# Patient Record
Sex: FEMALE | Race: WHITE | Employment: FULL TIME | URBAN - METROPOLITAN AREA
[De-identification: names, ages, dates, MRNs, and addresses within clinical notes are randomized per-mention and may not be internally consistent; named-entity substitution may affect disease eponyms.]

---

## 2017-11-16 PROBLEM — R00.2 PALPITATIONS: Status: ACTIVE | Noted: 2017-11-16

## 2017-11-16 PROBLEM — R07.89 OTHER CHEST PAIN: Chronic | Status: ACTIVE | Noted: 2017-11-16

## 2017-12-26 ENCOUNTER — HOSPITAL ENCOUNTER (OUTPATIENT)
Dept: SURGERY | Age: 57
Discharge: HOME | End: 2017-12-26
Attending: ORTHOPAEDIC SURGERY
Payer: COMMERCIAL

## 2017-12-26 ENCOUNTER — HOSPITAL ENCOUNTER (OUTPATIENT)
Dept: PHYSICAL THERAPY | Age: 57
Discharge: HOME | End: 2017-12-26
Attending: ORTHOPAEDIC SURGERY
Payer: COMMERCIAL

## 2017-12-26 VITALS
OXYGEN SATURATION: 100 % | HEIGHT: 66 IN | HEART RATE: 51 BPM | DIASTOLIC BLOOD PRESSURE: 93 MMHG | SYSTOLIC BLOOD PRESSURE: 144 MMHG | WEIGHT: 235.5 LBS | TEMPERATURE: 95.1 F | BODY MASS INDEX: 37.85 KG/M2

## 2017-12-26 LAB
ANION GAP SERPL CALC-SCNC: 8 MMOL/L (ref 7–16)
BACTERIA SPEC CULT: NORMAL
BUN SERPL-MCNC: 20 MG/DL (ref 6–23)
CALCIUM SERPL-MCNC: 9.3 MG/DL (ref 8.3–10.4)
CHLORIDE SERPL-SCNC: 104 MMOL/L (ref 98–107)
CO2 SERPL-SCNC: 30 MMOL/L (ref 21–32)
CREAT SERPL-MCNC: 0.88 MG/DL (ref 0.6–1)
ERYTHROCYTE [DISTWIDTH] IN BLOOD BY AUTOMATED COUNT: 13.6 % (ref 11.9–14.6)
GLUCOSE SERPL-MCNC: 120 MG/DL (ref 65–100)
HCT VFR BLD AUTO: 41.3 % (ref 35.8–46.3)
HGB BLD-MCNC: 13.5 G/DL (ref 11.7–15.4)
MCH RBC QN AUTO: 29.2 PG (ref 26.1–32.9)
MCHC RBC AUTO-ENTMCNC: 32.7 G/DL (ref 31.4–35)
MCV RBC AUTO: 89.4 FL (ref 79.6–97.8)
PLATELET # BLD AUTO: 256 K/UL (ref 150–450)
PMV BLD AUTO: 11 FL (ref 10.8–14.1)
POTASSIUM SERPL-SCNC: 3.3 MMOL/L (ref 3.5–5.1)
RBC # BLD AUTO: 4.62 M/UL (ref 4.05–5.25)
SERVICE CMNT-IMP: NORMAL
SODIUM SERPL-SCNC: 142 MMOL/L (ref 136–145)
WBC # BLD AUTO: 6.4 K/UL (ref 4.3–11.1)

## 2017-12-26 PROCEDURE — 85027 COMPLETE CBC AUTOMATED: CPT | Performed by: ANESTHESIOLOGY

## 2017-12-26 PROCEDURE — 80048 BASIC METABOLIC PNL TOTAL CA: CPT | Performed by: ANESTHESIOLOGY

## 2017-12-26 PROCEDURE — 97161 PT EVAL LOW COMPLEX 20 MIN: CPT

## 2017-12-26 PROCEDURE — 77030027138 HC INCENT SPIROMETER -A

## 2017-12-26 PROCEDURE — 87641 MR-STAPH DNA AMP PROBE: CPT | Performed by: ORTHOPAEDIC SURGERY

## 2017-12-26 PROCEDURE — 36415 COLL VENOUS BLD VENIPUNCTURE: CPT | Performed by: ANESTHESIOLOGY

## 2017-12-26 RX ORDER — RANITIDINE 150 MG/1
150 TABLET, FILM COATED ORAL
COMMUNITY

## 2017-12-26 RX ORDER — MELATONIN
2000 DAILY
COMMUNITY

## 2017-12-26 RX ORDER — DM/PE/ACETAMINOPHEN/CHLORPHENR 10-5-325-2
2000 TABLET, SEQUENTIAL ORAL 2 TIMES DAILY
COMMUNITY
End: 2020-02-27 | Stop reason: CLARIF

## 2017-12-26 RX ORDER — DIAPER,BRIEF,INFANT-TODD,DISP
1 EACH MISCELLANEOUS DAILY
COMMUNITY

## 2017-12-26 RX ORDER — LANOLIN ALCOHOL/MO/W.PET/CERES
1000 CREAM (GRAM) TOPICAL DAILY
COMMUNITY

## 2017-12-26 RX ORDER — LEVOTHYROXINE SODIUM 150 UG/1
150 TABLET ORAL
COMMUNITY

## 2017-12-26 RX ORDER — DICLOFENAC SODIUM 75 MG/1
75 TABLET, DELAYED RELEASE ORAL 2 TIMES DAILY
COMMUNITY
End: 2018-01-13

## 2017-12-26 RX ORDER — ASCORBIC ACID 500 MG
1000 TABLET ORAL DAILY
COMMUNITY

## 2017-12-26 RX ORDER — ASPIRIN 81 MG/1
81 TABLET ORAL
COMMUNITY
End: 2018-01-13

## 2017-12-26 NOTE — PERIOP NOTES
[address] December 26, 2017       RE: Sidney Myrick      To Whom It May Concern,    This is to certify that Sidney Myrick was at [facility] for a pre-op assessment/appointment for surgery. Please feel free to contact my office if you have any questions or concerns. Thank you for your assistance in this matter.       Sincerely,  Amish Sharma RN    [phone]

## 2017-12-26 NOTE — PROGRESS NOTES
12/26/17 1200   Oxygen Therapy   O2 Sat (%) 96 %   Pulse via Oximetry 51 beats per minute   O2 Device Room air   Pre-Treatment   Breath Sounds Bilateral Clear   Pre FEV1 (liters) 2.6 liters   % Predicted 92   Incentive Spirometry Treatment   Actual Volume (ml) 2000 ml     Initial respiratory Assessment completed with pt. Pt was interviewed and evaluated in [facility] prior to surgery. Patient ID:  Dariela Pelaez  [id number]  60 y.o.  1960  Surgeon: Ej Schmidt  Date of Surgery: 1/12/2018  Procedure: Total Left Knee Arthroplasty  Primary Care Physician: Talia Oakley -[phone]  Specialists:                                  Pt instructed in the use of Incentive Spirometry. Pt instructed to bring Incentive Spirometer back on date of surgery & to start using Is upon return to pt room.     Pt taught proper cough technique      History of smoking:  FORMER SMOKER -CURRENTLY VAPES FOR PAST 3 YEARS   Quit date:    Secondhand smoke:NONE      Past procedures with Oxygen desaturation:NONE    Past Medical History:   Diagnosis Date    Arthritis     CAD (coronary artery disease)     one stent    Former cigarette smoker     GERD (gastroesophageal reflux disease)     controlled with med     Hypertension     on med for control     Morbid obesity ([address]     BMI=38.0    Nicotine vapor product user     Other chest pain 11/16/2017    Palpitations 11/16/2017    Psychiatric disorder     On cymbalta for depression     PUD (peptic ulcer disease)     hx; no current problems    Sleep apnea     recently diagnosed; instructed to bring c-pap dos if received by then    Thyroid disease     hypo                                                                                                                                                       Respiratory history:                                   DENIES SOB                                  Respiratory meds:                                         FAMILY PRESENT: SPOUSE,                                             PAST SLEEP STUDY:        YES      - LAST Friday NIGHT   HX OF CHIN:                      YES                                   CHIN assessment:                                               SLEEPS ON SIDE       PHYSICAL EXAM   Body mass index is 38.01 kg/(m^2).    Visit Vitals    BP (!) 144/93 (BP 1 Location: Right arm, BP Patient Position: At rest;Sitting)    Pulse (!) 51    Temp 95.1 °F (35.1 °C)    Ht 5' 6\" (1.676 m)    Wt 106.8 kg (235 lb 8 oz)    SpO2 100%    BMI 38.01 kg/m2     Neck circumference:    42cm    Loud snoring:YES       Witnessed apnea or wakening gasping or choking: , APNEA    Awakens with headaches:DENIES    Morning or daytime tiredness/ sleepiness:   TIRED     Dry mouth or sore throat in morning:YES     Harris stage:4    SACS score:30    STOP/BAN                              CPAP:SUPPOSED TO GET CPAP BEFORE SURGERY                CONT SAT HS& O2 AT 3 LPM HS   IF DOES NOT HAVE HER CPAP YET     Referrals:    Pt. Phone Number:

## 2017-12-26 NOTE — PERIOP NOTES
Patient verified name, , and surgery as listed in [facility]. Type 3 surgery, walk in assessment complete. Labs per surgeon: MRSA nasal swab ; results pending. Labs per anesthesia protocol: CBC, BMP; results within [facility] protocols. T&S ordered for dos. EKG: last done 17; within [facility] protocols. Record on chart. Stress test dated 17 printed and on chart for reference if needed. Patient has [facility] cardiology appointment on 18. Will need to obtain note. Hibiclens and instructions to return bottle on DOS given per hospital policy. Nasal Swab collected per MD order and instructions for Mupirocin nasal ointment if required. Patient provided with handouts including Guide to Surgery, Pain Management, Hand Hygiene, Blood Transfusion Education, and [facility] Brochure. Patient answered medical/surgical history questions at their best of ability. All prior to admission medications documented in [facility]. Original medication prescription bottles not visualized during patient appointment. Patient instructed to hold all vitamins 7 days prior to surgery and NSAIDS 5 days prior to surgery. Medications to be held: vitamins, diclofenac. Patient instructed to continue previous medications as prescribed prior to surgery and to take the following medications the day of surgery according to anesthesia guidelines with a small sip of water: synthroid, duloxetine. Patient teach back successful and patient demonstrates knowledge of instruction.

## 2017-12-26 NOTE — PROGRESS NOTES
Candy Reyes  : [birth year](89 y.o.) [facility] [place] [address].  [phone]       Physical Therapy Prehab Plan of Treatment and Evaluation Summary:2017    ICD-10: Treatment Diagnosis:   · Pain in Left Knee (M25.562)  · Stiffness of Left Knee, Not elsewhere classified (M25.662)  · Difficulty in walking, Not elsewhere classified (R26.2)  · Other abnormalities of gait and mobility (R26.89)  Precautions/Allergies:   Codeine; Macrobid [nitrofurantoin monohyd/m-cryst]; and Tramadol  MEDICAL/REFERRING DIAGNOSIS:  Pain in left knee [M25.562]  Unilateral primary osteoarthritis, left knee [M17.12]  REFERRING PHYSICIAN: Thao Bradley MD  DATE OF SURGERY: 18   Assessment:   Comments:  Pain in left knee joint with decreased independence with functional mobility. Pt plans to return home following hospital stay. Pt's  present and supportive. PROBLEM LIST (Impacting functional limitations):  Ms. Christal Ahumada presents with the following left lower extremity(s) problems:  1. Transfers  2. Gait  3. Strength  4. Range of Motion  5. Pain   INTERVENTIONS PLANNED:  1. Home Exercise Program  2. Educational Discussion     TREATMENT PLAN: Effective Dates: 2017 TO 2017. Frequency/Duration: Patient to continue to perform home exercise program at least twice per day up until her surgery. GOALS: (Goals have been discussed and agreed upon with patient.)  Discharge Goals: Time Frame: 1 Day  1. Patient will demonstrate independence with a home exercise program designed to increase strength, range of motion and pain control to minimize functional deficits and optimize patient for total joint replacement. Rehabilitation Potential For Stated Goals: Good  Regarding [address] [place] [address] [address] therapy, I certify that the treatment plan above will be carried out by a therapist or under their direction.   Thank you for this referral,  Diego Gardner, PT HISTORY:   Present Symptoms:  Pain Intensity 1: 2  Pain Location 1: Knee  Pain Orientation 1: Left   History of Present Injury/Illness (Reason for Referral):  Medical/Referring Diagnosis: Pain in left knee [M25.562]  Unilateral primary osteoarthritis, left knee [M17.12]   Past Medical History/Comorbidities:   Ms. Jayro Bennett  has a past medical history of Arthritis; CAD (coronary artery disease); Former cigarette smoker; GERD (gastroesophageal reflux disease); Hypertension; Morbid obesity ([address] Nicotine vapor product user; Other chest pain (2017); Palpitations (2017); Psychiatric disorder; PUD (peptic ulcer disease); Sleep apnea; and Thyroid disease. She also has no past medical history of Adverse effect of anesthesia; Aneurysm ([address] Arrhythmia; Asthma; Autoimmune disease ([address] Cancer ([address] Chronic kidney disease; Chronic obstructive pulmonary disease ([address] Chronic pain; Coagulation defects; Coagulation disorder ([address] COPD; Diabetes ([address] Difficult intubation; Endocarditis; Heart failure ([address] Ill-defined condition; Liver disease; Malignant hyperthermia due to anesthesia; Nausea & vomiting; Non-nicotine vapor product user; Other ill-defined conditions(799.89); Pseudocholinesterase deficiency; Rheumatic fever; Seizures ([address] Stroke [facility]); or Thromboembolus ([address] Ms. Jayro Bennett  has a past surgical history that includes hx hysterectomy; hx orthopaedic; hx orthopaedic; hx orthopaedic; hx heent; hx cataract removal; hx coronary stent placement (2012); hx heart catheterization (2013); and hx  section (, ).   Social History/Living Environment:   Home Environment: Private residence  # Steps to Enter: 2  One/Two Story Residence: Two story, live on 1st floor  # of Interior Steps: 13  Living Alone: No  Support Systems: Spouse/Significant Other/Partner  Patient Expects to be Discharged to[de-identified] Private residence  Current DME Used/Available at Home: Cane, straight  Tub or Shower Type: Shower  Work/Activity:  Employment requires heavy activity, consisting a lot of getting in and out of a vehicle. Dominant Side:  LEFT  Current Medications:  See Pre-assessment nursing note   Number of Personal Factors/Comorbidities that affect the Plan of Care: 0: LOW COMPLEXITY   EXAMINATION:   ADLs (Current Functional Status):   Ambulation:  [x] Independent  [] Walk Indoors Only  [] Walk Outdoors  [] Use Assistive Device  [] Use Wheelchair Only Dressing:  [x] Independent  Requires Assistance from Someone for:  [] Sock/Shoes  [] Pants  [] Everything   Bathing/Showering:   [x] Independent  [] Requires Assistance from Someone  [] Sponge Bath Only Household Activities:  [x] Routine house and yard work  [] Light Housework Only  [] None   Observation/Orthostatic Postural Assessment:   Within defined limits   ROM/Flexibility:   Gross Assessment: Yes  AROM: Generally decreased, functional                LLE AROM  L Knee Flexion: 95  L Knee Extension: 35      RLE Assessment  RLE Assessment (WDL): Within defined limits   Strength:   Gross Assessment: Yes  Strength: Generally decreased, functional                  Functional Mobility:    Gross Assessment: Yes  Coordination: Generally decreased, functional    Gait Description (WDL): Within defined limits  Stand to Sit: Independent  Sit to Stand: Independent  Distance (ft): 1000 Feet (ft)  Ambulation - Level of Assistance: Independent  Gait Abnormalities: Antalgic          Balance:    Sitting: Intact  Standing: Intact   Body Structures Involved:  1. Bones  2. Joints  3. Muscles Body Functions Affected:  1. Neuromusculoskeletal  2. Movement Related Activities and Participation Affected:  1. Mobility   Number of elements that affect the Plan of Care: 4+: HIGH COMPLEXITY   CLINICAL PRESENTATION:   Presentation: Stable and uncomplicated: LOW COMPLEXITY   CLINICAL DECISION MAKING:   Outcome Measure:    Tool Used: Lower Extremity Functional Scale (LEFS)  Score:  Initial: 43/80 Most Recent: X/80 (Date: -- )   Interpretation of Score: 20 questions each scored on a 5 point scale with 0 representing \"extreme difficulty or unable to perform\" and 4 representing \"no difficulty\". The lower the score, the greater the functional disability. 80/80 represents no disability. Minimal detectable change is 9 points. Score 80 79-65 64-49 48-33 32-17 16-1 0   Modifier CH CI CJ CK CL CM CN     ? Mobility - Walking and Moving Around:     - CURRENT STATUS: CK - 40%-59% impaired, limited or restricted    - GOAL STATUS: CK - 40%-59% impaired, limited or restricted    - D/C STATUS:  CK - 40%-59% impaired, limited or restricted  Medical Necessity:   · Ms. Coby Hsieh is expected to optimize her lower extremity strength and ROM in preparation for joint replacement surgery. Reason for Services/Other Comments:  · Achieve baseline assesment of musculoskeletal system, functional mobility and home environment. , educate in PT HEP in preparation for surgery, educate in hospital plan of care. Use of outcome tool(s) and clinical judgement create a POC that gives a: Clear prediction of patient's progress: LOW COMPLEXITY   TREATMENT:   Treatment/Session Assessment:  Patient was instructed in PT- HEP to increase strength and ROM in LEs. Answered all questions. · Post session pain:  2  · Compliance with Program/Exercises: compliant most of the time.   Total Treatment Duration:  PT Patient Time In/Time Out  Time In: 1300  Time Out: [address], PT

## 2018-01-04 PROBLEM — Z01.810 PREOP CARDIOVASCULAR EXAM: Status: ACTIVE | Noted: 2018-01-04

## 2018-01-05 NOTE — PERIOP NOTES
Pt evaluated by Cardiology 1/4/18. Per MD note, pt low CV risk, normal myocardial perfusion scan. Complete office note in EMR and copy on hard chart for reference.

## 2018-01-05 NOTE — ADVANCED PRACTICE NURSE
Total Joint Surgery Preoperative Chart Review      Patient ID:  Carol Ann Tineo  [id number]  46 y.o.  1960  Surgeon: Dr Nusrat Hammer  Date of Surgery: 2018  Procedure: Total Left Knee Arthroplasty  Primary Care Physician: Jeremie Stevens -[phone]  Specialty Physician(s):      Subjective:   Carol Ann Tineo is a 59 y.o. WHITE OR  female who presents for preoperative evaluation for Total Left Knee arthroplasty. This is a preoperative chart review note based on data collected by the nurse at the surgical Pre-Assessment visit. Past Medical History:   Diagnosis Date    Arthritis     CAD (coronary artery disease)     one stent    Former cigarette smoker     GERD (gastroesophageal reflux disease)     controlled with med     Hypertension     on med for control     Morbid obesity ([address]     BMI=38.0    Nicotine vapor product user     Other chest pain 2017    Palpitations 2017    Psychiatric disorder     On cymbalta for depression     PUD (peptic ulcer disease)     hx; no current problems    Sleep apnea     recently diagnosed; instructed to bring c-pap dos if received by then    Thyroid disease     hypo      Past Surgical History:   Procedure Laterality Date    HX CATARACT REMOVAL      bilateral    HX  SECTION  ,     HX CORONARY STENT PLACEMENT  2012     3.5 x 32 Ion ALEE to proximal RCA by Dr Lashawn Cunningham  2013    LM:nl,pLAD:30% stenosis,LCX:mild irregularities. .RCA:dominant vessel with patent long stented segment. 40-50% ostial stenosis with FFR=0.89. LV:nl EF.     HX HEENT      Thyroidectomy    HX HYSTERECTOMY      with bladder tac    HX ORTHOPAEDIC      Right shoulder surgery    HX ORTHOPAEDIC      bilateral carpal tunnel    HX ORTHOPAEDIC      foot surgery     Family History   Problem Relation Age of Onset    Heart Disease Father     Cancer Sister     No Known Problems Mother     Thyroid Disease Brother  Thyroid Disease Sister     Diabetes Neg Hx     Lung Disease Neg Hx     Stroke Neg Hx       Social History   Substance Use Topics    Smoking status: Former Smoker     Packs/day: 1.00     Years: 3.00     Quit date: 12/26/2012    Smokeless tobacco: Never Used    Alcohol use No       Prior to Admission medications    Medication Sig Start Date End Date Taking? Authorizing Provider   aspirin delayed-release 81 mg tablet Take 81 mg by mouth nightly. Yes Historical Provider   levothyroxine (SYNTHROID) 150 mcg tablet Take 150 mcg by mouth Daily (before breakfast). Take / use AM day of surgery  per anesthesia protocols. Indications: hypothyroidism   Yes Historical Provider   diclofenac EC (VOLTAREN) 75 mg EC tablet Take 75 mg by mouth two (2) times a day. Indications: OSTEOARTHRITIS   Yes Historical Provider   raNITIdine (ZANTAC) 150 mg tablet Take 150 mg by mouth nightly. Indications: gastroesophageal reflux disease   Yes Historical Provider   TURMERIC ROOT EXTRACT PO Take 400 mg by mouth daily. Yes Historical Provider   cyanocobalamin 1,000 mcg tablet Take 1,000 mcg by mouth daily. Yes Historical Provider   ascorbic acid, vitamin C, (VITAMIN C) 500 mg tablet Take 1,000 mg by mouth daily. Yes Historical Provider   A-CYSTEINE/ARG ZINC/GLUT/MV-MN (L GLUTAMINE-N ACET-ARG-VIT-MIN PO) Take 1,000 mg by mouth daily. Yes Historical Provider   biotin 10,000 mcg cap Take 1 Cap by mouth daily. Yes Historical Provider   cholecalciferol (VITAMIN D3) 1,000 unit tablet Take 2,000 Units by mouth daily. Yes Historical Provider   glucosamine 1,000 mg tab Take 2,000 mg by mouth two (2) times a day. Yes Historical Provider   omega 3-dha-epa-fish oil (FISH OIL) 100-160-1,000 mg cap Take 1 Cap by mouth daily. Yes Historical Provider   COCONUT OIL PO Take 1,000 mg by mouth daily. Yes Historical Provider   B.infantis-B.ani-B.long-B.bifi (PROBIOTIC 4X) 10-15 mg TbEC Take 1 Tab by mouth two (2) times a day.    Yes Historical Provider   nebivolol (BYSTOLIC) 10 mg tablet Take 1 Tab by mouth daily. Indications: hypertension  Patient taking differently: Take 10 mg by mouth nightly. Indications: hypertension 12/22/17  Yes Inocencia Eckert MD   ezetimibe (ZETIA) 10 mg tablet Take 10 mg by mouth nightly. Indications: hypercholesterolemia   Yes Historical Provider   losartan (COZAAR) 100 mg tablet TAKE 1 TABLET BY MOUTH EVERY DAY 10/16/17  Yes Historical Provider   nitroglycerin (NITROSTAT) 0.4 mg SL tablet 1 Tab by SubLINGual route every five (5) minutes as needed for Chest Pain. 11/16/17  Yes Inocencia Eckert MD   hydrochlorothiazide (HYDRODIURIL) 25 mg tablet Take 25 mg by mouth daily. Indications: hypertension   Yes Historical Provider   DULoxetine (CYMBALTA) 60 mg capsule Take 60 mg by mouth daily. Take / use AM day of surgery  per anesthesia protocols. Indications: ANXIETY WITH DEPRESSION   Yes Historical Provider   methylPREDNISolone (MEDROL DOSEPACK) 4 mg tablet Take  by mouth. Historical Provider     Allergies   Allergen Reactions    Codeine Itching    Macrobid [Nitrofurantoin Monohyd/M-Cryst] Itching    Tramadol Itching          Objective:     Physical Exam:   Visit Vitals    BP (!) 144/93 (BP 1 Location: Right arm, BP Patient Position: At rest;Sitting)    Pulse (!) 51    Temp 95.1 °F (35.1 °C)    Ht 5' 6\" (1.676 m)    Wt 106.8 kg (235 lb 8 oz)    SpO2 100%    BMI 38.01 kg/m2         ECG:    EKG Results     None          Data Review:   Labs:   reviewed      Problem List:  )  Patient Active Problem List   Diagnosis Code    Unstable angina (HCC) I20.0    Dyslipidemia E78.5    Obesity E66.9    Hypertension I10    Tobacco abuse Z72.0    Coronary atherosclerosis of native coronary artery I25.10    Accelerated hypertension I10    Other chest pain R07.89    Palpitations R00.2    Preop cardiovascular exam Z01.810       Total Joint Surgery Pre-Assessment Recommendations:     New diagnosis of CHIN.  Patient instructed to bring CPAP if not will have autoset on standby for use during  Hospitalization. Recommend continuous saturation monitoring hours of sleep, during hospitalization.       Signed By: Huy Hobson NP-C    January 5, 2018

## 2018-01-11 ENCOUNTER — ANESTHESIA EVENT (OUTPATIENT)
Dept: SURGERY | Age: 58
End: 2018-01-11
Payer: COMMERCIAL

## 2018-01-12 ENCOUNTER — HOSPITAL ENCOUNTER (OUTPATIENT)
Age: 58
Setting detail: OBSERVATION
Discharge: HOME HEALTH | End: 2018-01-13
Attending: ORTHOPAEDIC SURGERY | Admitting: ORTHOPAEDIC SURGERY
Payer: COMMERCIAL

## 2018-01-12 ENCOUNTER — ANESTHESIA (OUTPATIENT)
Dept: SURGERY | Age: 58
End: 2018-01-12
Payer: COMMERCIAL

## 2018-01-12 DIAGNOSIS — M17.10 KNEE ARTHROPATHY: Primary | ICD-10-CM

## 2018-01-12 LAB
ABO + RH BLD: NORMAL
APTT PPP: 25.2 SEC (ref 23.2–35.3)
BLOOD GROUP ANTIBODIES SERPL: NORMAL
GLUCOSE BLD STRIP.AUTO-MCNC: 100 MG/DL (ref 65–100)
HGB BLD-MCNC: 10.9 G/DL (ref 11.7–15.4)
INR PPP: 0.9
PROTHROMBIN TIME: 12 SEC (ref 11.5–14.5)
SPECIMEN EXP DATE BLD: NORMAL

## 2018-01-12 PROCEDURE — 86901 BLOOD TYPING SEROLOGIC RH(D): CPT | Performed by: ORTHOPAEDIC SURGERY

## 2018-01-12 PROCEDURE — 99218 HC RM OBSERVATION: CPT

## 2018-01-12 PROCEDURE — 85018 HEMOGLOBIN: CPT | Performed by: ORTHOPAEDIC SURGERY

## 2018-01-12 PROCEDURE — 77030020782 HC GWN BAIR PAWS FLX 3M -B: Performed by: ANESTHESIOLOGY

## 2018-01-12 PROCEDURE — G0378 HOSPITAL OBSERVATION PER HR: HCPCS

## 2018-01-12 PROCEDURE — C1713 ANCHOR/SCREW BN/BN,TIS/BN: HCPCS | Performed by: ORTHOPAEDIC SURGERY

## 2018-01-12 PROCEDURE — 86580 TB INTRADERMAL TEST: CPT | Performed by: ORTHOPAEDIC SURGERY

## 2018-01-12 PROCEDURE — 77030002966 HC SUT PDS J&J -A: Performed by: ORTHOPAEDIC SURGERY

## 2018-01-12 PROCEDURE — 77030018846 HC SOL IRR STRL H20 ICUM -A: Performed by: ORTHOPAEDIC SURGERY

## 2018-01-12 PROCEDURE — 97161 PT EVAL LOW COMPLEX 20 MIN: CPT

## 2018-01-12 PROCEDURE — 76060000035 HC ANESTHESIA 2 TO 2.5 HR: Performed by: ORTHOPAEDIC SURGERY

## 2018-01-12 PROCEDURE — 77030019557 HC ELECTRD VES SEAL MEDT -F: Performed by: ORTHOPAEDIC SURGERY

## 2018-01-12 PROCEDURE — 74011250636 HC RX REV CODE- 250/636

## 2018-01-12 PROCEDURE — 77030002933 HC SUT MCRYL J&J -A: Performed by: ORTHOPAEDIC SURGERY

## 2018-01-12 PROCEDURE — 94760 N-INVAS EAR/PLS OXIMETRY 1: CPT

## 2018-01-12 PROCEDURE — 77030003665 HC NDL SPN BBMI -A: Performed by: ANESTHESIOLOGY

## 2018-01-12 PROCEDURE — 74011000250 HC RX REV CODE- 250

## 2018-01-12 PROCEDURE — 77030011208: Performed by: ORTHOPAEDIC SURGERY

## 2018-01-12 PROCEDURE — 82962 GLUCOSE BLOOD TEST: CPT

## 2018-01-12 PROCEDURE — 76210000016 HC OR PH I REC 1 TO 1.5 HR: Performed by: ORTHOPAEDIC SURGERY

## 2018-01-12 PROCEDURE — C1776 JOINT DEVICE (IMPLANTABLE): HCPCS | Performed by: ORTHOPAEDIC SURGERY

## 2018-01-12 PROCEDURE — 77030013727 HC IRR FAN PULSVC ZIMM -B: Performed by: ORTHOPAEDIC SURGERY

## 2018-01-12 PROCEDURE — 74011250636 HC RX REV CODE- 250/636: Performed by: ORTHOPAEDIC SURGERY

## 2018-01-12 PROCEDURE — 77030000032 HC CUF TRNQT ZIMM -B: Performed by: ORTHOPAEDIC SURGERY

## 2018-01-12 PROCEDURE — 74011000302 HC RX REV CODE- 302: Performed by: ORTHOPAEDIC SURGERY

## 2018-01-12 PROCEDURE — 74011250637 HC RX REV CODE- 250/637: Performed by: ORTHOPAEDIC SURGERY

## 2018-01-12 PROCEDURE — 77030006790 HC BLD SAW OSC ZIMM -B: Performed by: ORTHOPAEDIC SURGERY

## 2018-01-12 PROCEDURE — 74011000250 HC RX REV CODE- 250: Performed by: ANESTHESIOLOGY

## 2018-01-12 PROCEDURE — 77030018547 HC SUT ETHBND1 J&J -B: Performed by: ORTHOPAEDIC SURGERY

## 2018-01-12 PROCEDURE — 77030018836 HC SOL IRR NACL ICUM -A: Performed by: ORTHOPAEDIC SURGERY

## 2018-01-12 PROCEDURE — 85610 PROTHROMBIN TIME: CPT | Performed by: ORTHOPAEDIC SURGERY

## 2018-01-12 PROCEDURE — 77030011640 HC PAD GRND REM COVD -A: Performed by: ORTHOPAEDIC SURGERY

## 2018-01-12 PROCEDURE — 76942 ECHO GUIDE FOR BIOPSY: CPT | Performed by: ORTHOPAEDIC SURGERY

## 2018-01-12 PROCEDURE — 77030012935 HC DRSG AQUACEL BMS -B: Performed by: ORTHOPAEDIC SURGERY

## 2018-01-12 PROCEDURE — 74011250636 HC RX REV CODE- 250/636: Performed by: ANESTHESIOLOGY

## 2018-01-12 PROCEDURE — 77030013819 HC MX SYS CEM ZIMM -B: Performed by: ORTHOPAEDIC SURGERY

## 2018-01-12 PROCEDURE — 97165 OT EVAL LOW COMPLEX 30 MIN: CPT

## 2018-01-12 PROCEDURE — 77030007880 HC KT SPN EPDRL BBMI -B: Performed by: ANESTHESIOLOGY

## 2018-01-12 PROCEDURE — 85730 THROMBOPLASTIN TIME PARTIAL: CPT | Performed by: ORTHOPAEDIC SURGERY

## 2018-01-12 PROCEDURE — 77030008467 HC STPLR SKN COVD -B: Performed by: ORTHOPAEDIC SURGERY

## 2018-01-12 PROCEDURE — 76010010054 HC POST OP PAIN BLOCK: Performed by: ORTHOPAEDIC SURGERY

## 2018-01-12 PROCEDURE — 76010000162 HC OR TIME 1.5 TO 2 HR INTENSV-TIER 1: Performed by: ORTHOPAEDIC SURGERY

## 2018-01-12 PROCEDURE — 77030034849: Performed by: ORTHOPAEDIC SURGERY

## 2018-01-12 PROCEDURE — 77030003602 HC NDL NRV BLK BBMI -B: Performed by: ANESTHESIOLOGY

## 2018-01-12 PROCEDURE — 74011000250 HC RX REV CODE- 250: Performed by: ORTHOPAEDIC SURGERY

## 2018-01-12 DEVICE — CEMENT BNE GENTAMC HV R+G 40GM -- PALACOS R+G 5036964: Type: IMPLANTABLE DEVICE | Site: KNEE | Status: FUNCTIONAL

## 2018-01-12 DEVICE — GENESIS II NON-POROUS TIBIAL                                    BASEPLATE SIZE 4 LEFT
Type: IMPLANTABLE DEVICE | Site: KNEE | Status: FUNCTIONAL
Brand: GENESIS II

## 2018-01-12 DEVICE — LEGION POSTERIOR STABILIZED HIGH                                    FLEX HIGHLY CROSS LINKED                                    POLYETHYLENE SIZE 3-4 11MM
Type: IMPLANTABLE DEVICE | Site: KNEE | Status: FUNCTIONAL
Brand: LEGION

## 2018-01-12 RX ORDER — MORPHINE SULFATE 10 MG/ML
INJECTION, SOLUTION INTRAMUSCULAR; INTRAVENOUS AS NEEDED
Status: DISCONTINUED | OUTPATIENT
Start: 2018-01-12 | End: 2018-01-12 | Stop reason: HOSPADM

## 2018-01-12 RX ORDER — SODIUM CHLORIDE, SODIUM LACTATE, POTASSIUM CHLORIDE, CALCIUM CHLORIDE 600; 310; 30; 20 MG/100ML; MG/100ML; MG/100ML; MG/100ML
1000 INJECTION, SOLUTION INTRAVENOUS CONTINUOUS
Status: DISCONTINUED | OUTPATIENT
Start: 2018-01-12 | End: 2018-01-12 | Stop reason: HOSPADM

## 2018-01-12 RX ORDER — ACETAMINOPHEN 500 MG
500 TABLET ORAL ONCE
Status: DISCONTINUED | OUTPATIENT
Start: 2018-01-12 | End: 2018-01-12 | Stop reason: HOSPADM

## 2018-01-12 RX ORDER — ASPIRIN 325 MG
325 TABLET, DELAYED RELEASE (ENTERIC COATED) ORAL EVERY 12 HOURS
Qty: 60 TAB | Refills: 0 | Status: SHIPPED | OUTPATIENT
Start: 2018-01-12 | End: 2019-08-16

## 2018-01-12 RX ORDER — LIDOCAINE HYDROCHLORIDE 10 MG/ML
0.1 INJECTION INFILTRATION; PERINEURAL AS NEEDED
Status: DISCONTINUED | OUTPATIENT
Start: 2018-01-12 | End: 2018-01-12 | Stop reason: HOSPADM

## 2018-01-12 RX ORDER — FENTANYL CITRATE 50 UG/ML
100 INJECTION, SOLUTION INTRAMUSCULAR; INTRAVENOUS AS NEEDED
Status: DISCONTINUED | OUTPATIENT
Start: 2018-01-12 | End: 2018-01-12 | Stop reason: HOSPADM

## 2018-01-12 RX ORDER — KETOROLAC TROMETHAMINE 30 MG/ML
INJECTION, SOLUTION INTRAMUSCULAR; INTRAVENOUS AS NEEDED
Status: DISCONTINUED | OUTPATIENT
Start: 2018-01-12 | End: 2018-01-12 | Stop reason: HOSPADM

## 2018-01-12 RX ORDER — ROPIVACAINE HYDROCHLORIDE 5 MG/ML
INJECTION, SOLUTION EPIDURAL; INFILTRATION; PERINEURAL AS NEEDED
Status: DISCONTINUED | OUTPATIENT
Start: 2018-01-12 | End: 2018-01-12 | Stop reason: HOSPADM

## 2018-01-12 RX ORDER — OXYCODONE AND ACETAMINOPHEN 7.5; 325 MG/1; MG/1
1 TABLET ORAL
Qty: 80 TAB | Refills: 0 | Status: SHIPPED | OUTPATIENT
Start: 2018-01-12 | End: 2019-08-16

## 2018-01-12 RX ORDER — NALOXONE HYDROCHLORIDE 0.4 MG/ML
.2-.4 INJECTION, SOLUTION INTRAMUSCULAR; INTRAVENOUS; SUBCUTANEOUS
Status: DISCONTINUED | OUTPATIENT
Start: 2018-01-12 | End: 2018-01-13 | Stop reason: HOSPADM

## 2018-01-12 RX ORDER — MORPHINE SULFATE 4 MG/ML
6 INJECTION, SOLUTION INTRAMUSCULAR; INTRAVENOUS
Status: DISCONTINUED | OUTPATIENT
Start: 2018-01-12 | End: 2018-01-13 | Stop reason: HOSPADM

## 2018-01-12 RX ORDER — LOSARTAN POTASSIUM 50 MG/1
100 TABLET ORAL DAILY
Status: DISCONTINUED | OUTPATIENT
Start: 2018-01-13 | End: 2018-01-13 | Stop reason: HOSPADM

## 2018-01-12 RX ORDER — PROPOFOL 10 MG/ML
INJECTION, EMULSION INTRAVENOUS
Status: DISCONTINUED | OUTPATIENT
Start: 2018-01-12 | End: 2018-01-12 | Stop reason: HOSPADM

## 2018-01-12 RX ORDER — DEXAMETHASONE SODIUM PHOSPHATE 100 MG/10ML
10 INJECTION INTRAMUSCULAR; INTRAVENOUS ONCE
Status: DISCONTINUED | OUTPATIENT
Start: 2018-01-13 | End: 2018-01-13 | Stop reason: HOSPADM

## 2018-01-12 RX ORDER — ZOLPIDEM TARTRATE 5 MG/1
5 TABLET ORAL
Status: DISCONTINUED | OUTPATIENT
Start: 2018-01-12 | End: 2018-01-13 | Stop reason: HOSPADM

## 2018-01-12 RX ORDER — PANTOPRAZOLE SODIUM 40 MG/1
40 TABLET, DELAYED RELEASE ORAL
Status: DISCONTINUED | OUTPATIENT
Start: 2018-01-12 | End: 2018-01-13 | Stop reason: HOSPADM

## 2018-01-12 RX ORDER — OXYCODONE HYDROCHLORIDE 5 MG/1
5 TABLET ORAL
Status: DISCONTINUED | OUTPATIENT
Start: 2018-01-12 | End: 2018-01-12 | Stop reason: HOSPADM

## 2018-01-12 RX ORDER — ONDANSETRON 2 MG/ML
4 INJECTION INTRAMUSCULAR; INTRAVENOUS ONCE
Status: DISCONTINUED | OUTPATIENT
Start: 2018-01-12 | End: 2018-01-12 | Stop reason: HOSPADM

## 2018-01-12 RX ORDER — CEFAZOLIN SODIUM/WATER 2 G/20 ML
2 SYRINGE (ML) INTRAVENOUS ONCE
Status: COMPLETED | OUTPATIENT
Start: 2018-01-12 | End: 2018-01-12

## 2018-01-12 RX ORDER — MIDAZOLAM HYDROCHLORIDE 1 MG/ML
2 INJECTION, SOLUTION INTRAMUSCULAR; INTRAVENOUS
Status: DISCONTINUED | OUTPATIENT
Start: 2018-01-12 | End: 2018-01-12 | Stop reason: HOSPADM

## 2018-01-12 RX ORDER — NEOMYCIN AND POLYMYXIN B SULFATES 40; 200000 MG/ML; [USP'U]/ML
SOLUTION IRRIGATION AS NEEDED
Status: DISCONTINUED | OUTPATIENT
Start: 2018-01-12 | End: 2018-01-12 | Stop reason: HOSPADM

## 2018-01-12 RX ORDER — NALOXONE HYDROCHLORIDE 0.4 MG/ML
0.1 INJECTION, SOLUTION INTRAMUSCULAR; INTRAVENOUS; SUBCUTANEOUS AS NEEDED
Status: DISCONTINUED | OUTPATIENT
Start: 2018-01-12 | End: 2018-01-12 | Stop reason: HOSPADM

## 2018-01-12 RX ORDER — AMOXICILLIN 250 MG
2 CAPSULE ORAL DAILY
Status: DISCONTINUED | OUTPATIENT
Start: 2018-01-13 | End: 2018-01-13 | Stop reason: HOSPADM

## 2018-01-12 RX ORDER — DIPHENHYDRAMINE HYDROCHLORIDE 50 MG/ML
INJECTION, SOLUTION INTRAMUSCULAR; INTRAVENOUS AS NEEDED
Status: DISCONTINUED | OUTPATIENT
Start: 2018-01-12 | End: 2018-01-12 | Stop reason: HOSPADM

## 2018-01-12 RX ORDER — OXYCODONE HYDROCHLORIDE 5 MG/1
10 TABLET ORAL
Status: DISCONTINUED | OUTPATIENT
Start: 2018-01-12 | End: 2018-01-12 | Stop reason: HOSPADM

## 2018-01-12 RX ORDER — DIPHENHYDRAMINE HYDROCHLORIDE 50 MG/ML
12.5 INJECTION, SOLUTION INTRAMUSCULAR; INTRAVENOUS ONCE
Status: DISCONTINUED | OUTPATIENT
Start: 2018-01-12 | End: 2018-01-12 | Stop reason: HOSPADM

## 2018-01-12 RX ORDER — SODIUM CHLORIDE 0.9 % (FLUSH) 0.9 %
5-10 SYRINGE (ML) INJECTION EVERY 8 HOURS
Status: DISCONTINUED | OUTPATIENT
Start: 2018-01-12 | End: 2018-01-13 | Stop reason: HOSPADM

## 2018-01-12 RX ORDER — DULOXETIN HYDROCHLORIDE 60 MG/1
60 CAPSULE, DELAYED RELEASE ORAL DAILY
Status: DISCONTINUED | OUTPATIENT
Start: 2018-01-13 | End: 2018-01-13 | Stop reason: HOSPADM

## 2018-01-12 RX ORDER — FENTANYL CITRATE 50 UG/ML
INJECTION, SOLUTION INTRAMUSCULAR; INTRAVENOUS AS NEEDED
Status: DISCONTINUED | OUTPATIENT
Start: 2018-01-12 | End: 2018-01-12 | Stop reason: HOSPADM

## 2018-01-12 RX ORDER — SODIUM CHLORIDE 0.9 % (FLUSH) 0.9 %
5-10 SYRINGE (ML) INJECTION AS NEEDED
Status: DISCONTINUED | OUTPATIENT
Start: 2018-01-12 | End: 2018-01-12 | Stop reason: HOSPADM

## 2018-01-12 RX ORDER — SODIUM CHLORIDE 9 MG/ML
100 INJECTION, SOLUTION INTRAVENOUS CONTINUOUS
Status: DISCONTINUED | OUTPATIENT
Start: 2018-01-12 | End: 2018-01-13 | Stop reason: HOSPADM

## 2018-01-12 RX ORDER — ASPIRIN 325 MG
325 TABLET, DELAYED RELEASE (ENTERIC COATED) ORAL EVERY 12 HOURS
Status: DISCONTINUED | OUTPATIENT
Start: 2018-01-12 | End: 2018-01-13 | Stop reason: HOSPADM

## 2018-01-12 RX ORDER — EPHEDRINE SULFATE 50 MG/ML
INJECTION, SOLUTION INTRAVENOUS AS NEEDED
Status: DISCONTINUED | OUTPATIENT
Start: 2018-01-12 | End: 2018-01-12 | Stop reason: HOSPADM

## 2018-01-12 RX ORDER — SODIUM CHLORIDE 0.9 % (FLUSH) 0.9 %
5-10 SYRINGE (ML) INJECTION AS NEEDED
Status: DISCONTINUED | OUTPATIENT
Start: 2018-01-12 | End: 2018-01-13 | Stop reason: HOSPADM

## 2018-01-12 RX ORDER — HYDROMORPHONE HYDROCHLORIDE 2 MG/ML
0.5 INJECTION, SOLUTION INTRAMUSCULAR; INTRAVENOUS; SUBCUTANEOUS
Status: DISCONTINUED | OUTPATIENT
Start: 2018-01-12 | End: 2018-01-12 | Stop reason: HOSPADM

## 2018-01-12 RX ORDER — SODIUM CHLORIDE 0.9 % (FLUSH) 0.9 %
5-10 SYRINGE (ML) INJECTION EVERY 8 HOURS
Status: DISCONTINUED | OUTPATIENT
Start: 2018-01-12 | End: 2018-01-12 | Stop reason: HOSPADM

## 2018-01-12 RX ORDER — ONDANSETRON 2 MG/ML
INJECTION INTRAMUSCULAR; INTRAVENOUS AS NEEDED
Status: DISCONTINUED | OUTPATIENT
Start: 2018-01-12 | End: 2018-01-12 | Stop reason: HOSPADM

## 2018-01-12 RX ORDER — SODIUM CHLORIDE, SODIUM LACTATE, POTASSIUM CHLORIDE, CALCIUM CHLORIDE 600; 310; 30; 20 MG/100ML; MG/100ML; MG/100ML; MG/100ML
75 INJECTION, SOLUTION INTRAVENOUS CONTINUOUS
Status: DISCONTINUED | OUTPATIENT
Start: 2018-01-12 | End: 2018-01-12 | Stop reason: HOSPADM

## 2018-01-12 RX ORDER — NEBIVOLOL 5 MG/1
10 TABLET ORAL DAILY
Status: DISCONTINUED | OUTPATIENT
Start: 2018-01-13 | End: 2018-01-13 | Stop reason: HOSPADM

## 2018-01-12 RX ORDER — TRANEXAMIC ACID 100 MG/ML
INJECTION, SOLUTION INTRAVENOUS AS NEEDED
Status: DISCONTINUED | OUTPATIENT
Start: 2018-01-12 | End: 2018-01-12 | Stop reason: HOSPADM

## 2018-01-12 RX ORDER — OXYCODONE AND ACETAMINOPHEN 7.5; 325 MG/1; MG/1
1 TABLET ORAL
Status: DISCONTINUED | OUTPATIENT
Start: 2018-01-12 | End: 2018-01-13 | Stop reason: HOSPADM

## 2018-01-12 RX ORDER — GUAIFENESIN 100 MG/5ML
81 LIQUID (ML) ORAL DAILY
Status: DISCONTINUED | OUTPATIENT
Start: 2018-01-12 | End: 2018-01-12

## 2018-01-12 RX ORDER — DIPHENHYDRAMINE HCL 25 MG
25 CAPSULE ORAL
Status: DISCONTINUED | OUTPATIENT
Start: 2018-01-12 | End: 2018-01-13 | Stop reason: HOSPADM

## 2018-01-12 RX ORDER — NITROGLYCERIN 0.4 MG/1
0.4 TABLET SUBLINGUAL AS NEEDED
Status: DISCONTINUED | OUTPATIENT
Start: 2018-01-12 | End: 2018-01-13 | Stop reason: HOSPADM

## 2018-01-12 RX ORDER — HYDROCHLOROTHIAZIDE 25 MG/1
25 TABLET ORAL DAILY
Status: DISCONTINUED | OUTPATIENT
Start: 2018-01-13 | End: 2018-01-13 | Stop reason: HOSPADM

## 2018-01-12 RX ORDER — ACETAMINOPHEN 500 MG
1000 TABLET ORAL EVERY 6 HOURS
Status: DISCONTINUED | OUTPATIENT
Start: 2018-01-13 | End: 2018-01-13 | Stop reason: HOSPADM

## 2018-01-12 RX ORDER — CEFAZOLIN SODIUM/WATER 2 G/20 ML
2 SYRINGE (ML) INTRAVENOUS EVERY 8 HOURS
Status: COMPLETED | OUTPATIENT
Start: 2018-01-12 | End: 2018-01-12

## 2018-01-12 RX ORDER — MIDAZOLAM HYDROCHLORIDE 1 MG/ML
INJECTION, SOLUTION INTRAMUSCULAR; INTRAVENOUS AS NEEDED
Status: DISCONTINUED | OUTPATIENT
Start: 2018-01-12 | End: 2018-01-12 | Stop reason: HOSPADM

## 2018-01-12 RX ORDER — ACETAMINOPHEN 325 MG/1
325 TABLET ORAL
Status: DISCONTINUED | OUTPATIENT
Start: 2018-01-12 | End: 2018-01-13 | Stop reason: HOSPADM

## 2018-01-12 RX ORDER — CELECOXIB 200 MG/1
200 CAPSULE ORAL EVERY 12 HOURS
Status: DISCONTINUED | OUTPATIENT
Start: 2018-01-12 | End: 2018-01-13 | Stop reason: HOSPADM

## 2018-01-12 RX ADMIN — PROPOFOL: 10 INJECTION, EMULSION INTRAVENOUS at 08:59

## 2018-01-12 RX ADMIN — SODIUM CHLORIDE, SODIUM LACTATE, POTASSIUM CHLORIDE, AND CALCIUM CHLORIDE 500 ML: 600; 310; 30; 20 INJECTION, SOLUTION INTRAVENOUS at 06:00

## 2018-01-12 RX ADMIN — PROPOFOL 25 MCG/KG/MIN: 10 INJECTION, EMULSION INTRAVENOUS at 08:10

## 2018-01-12 RX ADMIN — TUBERCULIN PURIFIED PROTEIN DERIVATIVE 5 UNITS: 5 INJECTION, SOLUTION INTRADERMAL at 06:17

## 2018-01-12 RX ADMIN — ONDANSETRON 4 MG: 2 INJECTION INTRAMUSCULAR; INTRAVENOUS at 08:34

## 2018-01-12 RX ADMIN — OXYCODONE HYDROCHLORIDE AND ACETAMINOPHEN 1 TABLET: 7.5; 325 TABLET ORAL at 18:51

## 2018-01-12 RX ADMIN — EPHEDRINE SULFATE 10 MG: 50 INJECTION, SOLUTION INTRAVENOUS at 08:20

## 2018-01-12 RX ADMIN — TRANEXAMIC ACID 1000 MG: 100 INJECTION, SOLUTION INTRAVENOUS at 08:12

## 2018-01-12 RX ADMIN — Medication 2 G: at 15:17

## 2018-01-12 RX ADMIN — Medication 2 G: at 08:00

## 2018-01-12 RX ADMIN — TRANEXAMIC ACID 1000 MG: 100 INJECTION, SOLUTION INTRAVENOUS at 09:16

## 2018-01-12 RX ADMIN — SODIUM CHLORIDE, SODIUM LACTATE, POTASSIUM CHLORIDE, AND CALCIUM CHLORIDE: 600; 310; 30; 20 INJECTION, SOLUTION INTRAVENOUS at 07:57

## 2018-01-12 RX ADMIN — MIDAZOLAM HYDROCHLORIDE 1 MG: 1 INJECTION, SOLUTION INTRAMUSCULAR; INTRAVENOUS at 08:51

## 2018-01-12 RX ADMIN — SODIUM CHLORIDE, SODIUM LACTATE, POTASSIUM CHLORIDE, AND CALCIUM CHLORIDE: 600; 310; 30; 20 INJECTION, SOLUTION INTRAVENOUS at 08:32

## 2018-01-12 RX ADMIN — ASPIRIN 325 MG: 325 TABLET, DELAYED RELEASE ORAL at 20:42

## 2018-01-12 RX ADMIN — ROPIVACAINE HYDROCHLORIDE 20 ML: 5 INJECTION, SOLUTION EPIDURAL; INFILTRATION; PERINEURAL at 07:02

## 2018-01-12 RX ADMIN — LIDOCAINE HYDROCHLORIDE 0.1 ML: 10 INJECTION, SOLUTION INFILTRATION; PERINEURAL at 06:17

## 2018-01-12 RX ADMIN — FENTANYL CITRATE 50 MCG: 50 INJECTION, SOLUTION INTRAMUSCULAR; INTRAVENOUS at 08:03

## 2018-01-12 RX ADMIN — FENTANYL CITRATE 50 MCG: 50 INJECTION, SOLUTION INTRAMUSCULAR; INTRAVENOUS at 07:59

## 2018-01-12 RX ADMIN — FENTANYL CITRATE 100 MCG: 50 INJECTION INTRAMUSCULAR; INTRAVENOUS at 06:58

## 2018-01-12 RX ADMIN — SODIUM CHLORIDE 100 ML/HR: 900 INJECTION, SOLUTION INTRAVENOUS at 20:46

## 2018-01-12 RX ADMIN — MIDAZOLAM HYDROCHLORIDE 0.5 MG: 1 INJECTION, SOLUTION INTRAMUSCULAR; INTRAVENOUS at 09:05

## 2018-01-12 RX ADMIN — MORPHINE SULFATE 6 MG: 4 INJECTION, SOLUTION INTRAMUSCULAR; INTRAVENOUS at 15:17

## 2018-01-12 RX ADMIN — MIDAZOLAM HYDROCHLORIDE 2 MG: 1 INJECTION, SOLUTION INTRAMUSCULAR; INTRAVENOUS at 06:58

## 2018-01-12 RX ADMIN — SODIUM CHLORIDE 100 ML/HR: 900 INJECTION, SOLUTION INTRAVENOUS at 12:00

## 2018-01-12 RX ADMIN — OXYCODONE HYDROCHLORIDE AND ACETAMINOPHEN 1 TABLET: 7.5; 325 TABLET ORAL at 22:51

## 2018-01-12 RX ADMIN — CELECOXIB 200 MG: 200 CAPSULE ORAL at 20:42

## 2018-01-12 RX ADMIN — Medication 2 G: at 23:52

## 2018-01-12 RX ADMIN — MIDAZOLAM HYDROCHLORIDE 2 MG: 1 INJECTION, SOLUTION INTRAMUSCULAR; INTRAVENOUS at 08:01

## 2018-01-12 RX ADMIN — PANTOPRAZOLE SODIUM 40 MG: 40 TABLET, DELAYED RELEASE ORAL at 20:42

## 2018-01-12 RX ADMIN — SODIUM CHLORIDE, SODIUM LACTATE, POTASSIUM CHLORIDE, AND CALCIUM CHLORIDE: 600; 310; 30; 20 INJECTION, SOLUTION INTRAVENOUS at 09:55

## 2018-01-12 RX ADMIN — OXYCODONE HYDROCHLORIDE AND ACETAMINOPHEN 1 TABLET: 7.5; 325 TABLET ORAL at 13:48

## 2018-01-12 RX ADMIN — DIPHENHYDRAMINE HYDROCHLORIDE 12.5 MG: 50 INJECTION, SOLUTION INTRAMUSCULAR; INTRAVENOUS at 08:33

## 2018-01-12 RX ADMIN — MIDAZOLAM HYDROCHLORIDE 0.5 MG: 1 INJECTION, SOLUTION INTRAMUSCULAR; INTRAVENOUS at 09:20

## 2018-01-12 NOTE — ANESTHESIA POSTPROCEDURE EVALUATION
Post-Anesthesia Evaluation and Assessment    Patient: Isis Romero MRN: [id number]  SSN: [id number]    YOB: 1960  Age: 59 y.o. Sex: female       Cardiovascular Function/Vital Signs  Visit Vitals    /74    Pulse (!) 51    Temp 36.3 °C (97.3 °F)    Resp 14    Ht 5' 6\" (1.676 m)    Wt 107 kg (235 lb 14.3 oz)    SpO2 100%    BMI 38.07 kg/m2       Patient is status post spinal anesthesia for Procedure(s):  LEFT KNEE ARTHROPLASTY TOTAL / LEFT. Nausea/Vomiting: None    Postoperative hydration reviewed and adequate. Pain:  Pain Scale 1: Visual (01/12/18 1001)  Pain Intensity 1: 0 (01/12/18 1001)   Managed    Neurological Status:   Neuro (WDL): Within Defined Limits (01/12/18 1001)  Neuro  Neurologic State: Lethargic (01/12/18 1001)  Orientation Level: Oriented to person (01/12/18 1006)  Speech: Clear (01/12/18 1006)  LUE Motor Response: Purposeful (01/12/18 1006)  LLE Motor Response: Purposeful (01/12/18 1006)  RUE Motor Response: Purposeful (01/12/18 1006)  RLE Motor Response: Purposeful (01/12/18 1006)   At baseline    Mental Status and Level of Consciousness: Arousable    Pulmonary Status:   O2 Device: Nasal cannula (01/12/18 1001)   Adequate oxygenation and airway patent    Complications related to anesthesia: None    Post-anesthesia assessment completed.  No concerns    Signed By: Chary Abebe MD     January 12, 2018

## 2018-01-12 NOTE — PERIOP NOTES
Betadine lavage: 17.5cc of betadine lot # [id number], exp. Date 08/19,  in 500cc of . 9NS Lot # [id number], exp.  Date : 1sep2020

## 2018-01-12 NOTE — ANESTHESIA PREPROCEDURE EVALUATION
Anesthetic History   No history of anesthetic complications            Review of Systems / Medical History  Patient summary reviewed and pertinent labs reviewed    Pulmonary        Sleep apnea: CPAP  Smoker         Neuro/Psych         Psychiatric history     Cardiovascular    Hypertension          CAD    Exercise tolerance: >4 METS  Comments: ALEE 2013.  Recent episode on CP found to be noncardiac in nature with negative NST    Denies recent CP, SOB, Palps, Syncope, Fatigue   GI/Hepatic/Renal     GERD: well controlled           Endo/Other      Hypothyroidism  Morbid obesity and arthritis     Other Findings   Comments: Sciatica with recent completion of medrol dose pack         Physical Exam    Airway  Mallampati: II  TM Distance: 4 - 6 cm  Neck ROM: normal range of motion   Mouth opening: Normal     Cardiovascular    Rhythm: regular  Rate: normal         Dental  No notable dental hx       Pulmonary  Breath sounds clear to auscultation               Abdominal  GI exam deferred       Other Findings            Anesthetic Plan    ASA: 3  Anesthesia type: spinal      Post-op pain plan if not by surgeon: peripheral nerve block single      Anesthetic plan and risks discussed with: Patient

## 2018-01-12 NOTE — PROGRESS NOTES
01/12/18 1425   Oxygen Therapy   O2 Sat (%) 98 %   Pulse via Oximetry 54 beats per minute   O2 Device Nasal cannula  (weaned to RA.)   O2 Flow Rate (L/min) 1 l/min   Patient achieved  1500  Ml/sec on IS. Patient encouraged to do 10 breaths every hour while awake-patient agreed and demonstrated. No shortness of breath or distress noted. BS are clear b/l. [facility] notes reviewed- Home CPAP noted at bedside.

## 2018-01-12 NOTE — PERIOP NOTES
TRANSFER - OUT REPORT:    Verbal report given to Vivian Gallardo RN on Abdulkadir Comp  being transferred to [facility] for routine progression of care       Report consisted of patients Situation, Background, Assessment and   Recommendations(SBAR). Information from the following report(s) Kardex, MAR and Recent Results was reviewed with the receiving nurse. Lines:   Peripheral IV 01/12/18 Left Hand (Active)   Site Assessment Clean, dry, & intact 1/12/2018  6:01 AM   Phlebitis Assessment 0 1/12/2018  6:01 AM   Infiltration Assessment 0 1/12/2018  6:01 AM   Dressing Status Clean, dry, & intact 1/12/2018  6:01 AM   Dressing Type Transparent;Tape 1/12/2018  6:01 AM   Hub Color/Line Status Green;Patent; Infusing 1/12/2018  6:01 AM   Action Taken Blood drawn 1/12/2018  6:01 AM        Opportunity for questions and clarification was provided.       Patient transported with:   Personal Medicine

## 2018-01-12 NOTE — PROGRESS NOTES
Problem: Self Care Deficits Care Plan (Adult)  Goal: *Acute Goals and Plan of Care (Insert Text)  GOALS:   DISCHARGE GOALS (in preparation for going home/rehab):  3 days  1. Ms. Cedric Coy will perform one lower body dressing activity with minimal assistance required to demonstrate improved functional mobility and safety. 2.  Ms. Cedric Coy will perform one lower body bathing activity with minimal assistance required to demonstrate improved functional mobility and safety. 3.  Ms. Cedric Coy will perform toileting/toilet transfer with contact guard assistance to demonstrate improved functional mobility and safety. 4.  Ms. Cedric Coy will perform shower transfer with contact guard assistance to demonstrate improved functional mobility and safety. JOINT CAMP OCCUPATIONAL THERAPY TKA: Initial Assessment 1/12/2018  OBSERVATION: Hospital Day: 1  Payor: Jeff Ene / Plan: Matilde Medina OTHER / Product Type: PPO /      NAME/AGE/GENDER: Jerman Hinojosa is a 59 y.o. female   PRIMARY DIAGNOSIS:  Acute pain of left knee [M25.562]  Unilateral primary osteoarthritis, left knee [M17.12]   Procedure(s) and Anesthesia Type:     * LEFT KNEE ARTHROPLASTY TOTAL / LEFT - Spinal (Left)  ICD-10: Treatment Diagnosis:    · Pain in Left Knee (M25.562)  · Stiffness of Left Knee, Not elsewhere classified (O65.451)      ASSESSMENT:     Ms. Cedric Coy is s/p left TKA and presents with decreased weight bearing on left LE and decreased independence with functional mobility and activities of daily living. Patient would benefit from skilled Occupational Therapy to maximize independence and safety with self-care task and functional mobility. Pt would also benefit from education on adaptive equipment and safety precautions in preparation for going home or for recommendations for post-hospital rehab program.  Patient plans for further rehab at home with home health services and good family support .   OT reviewed therapy schedule and plan of care with patient. Patient was able to transfer and preform self care skills as charted below. Patient instructed to call for assistance when needing to get up from the bed and all needs in reach. Patient verbalized understanding of call light. This section established at most recent assessment   PROBLEM LIST (Impairments causing functional limitations):  1. Decreased Strength  2. Decreased ADL/Functional Activities  3. Decreased Transfer Abilities  4. Increased Pain  5. Increased Fatigue  6. Decreased Flexibility/Joint Mobility  7. Decreased Knowledge of Precautions   INTERVENTIONS PLANNED: (Benefits and precautions of occupational therapy have been discussed with the patient.)  1. Activities of daily living training  2. Adaptive equipment training  3. Balance training  4. Clothing management  5. Donning&doffing training  6. Theraputic activity     TREATMENT PLAN: Frequency/Duration: Follow patient 1-2 times to address above goals. Rehabilitation Potential For Stated Goals: Good     RECOMMENDED REHABILITATION/EQUIPMENT: (at time of discharge pending progress): Continue Skilled Therapy and Home Health: Physical Therapy. OCCUPATIONAL PROFILE AND HISTORY:   History of Present Injury/Illness (Reason for Referral): Pt presents this date s/p (left) TKA. Past Medical History/Comorbidities:   Ms. Nael Peterson  has a past medical history of Arthritis; CAD (coronary artery disease); Former cigarette smoker; GERD (gastroesophageal reflux disease); Hypertension; Morbid obesity ([address] Nicotine vapor product user; Other chest pain (11/16/2017); Palpitations (11/16/2017); Psychiatric disorder; PUD (peptic ulcer disease); Sleep apnea; and Thyroid disease. She also has no past medical history of Adverse effect of anesthesia; Aneurysm ([address] Arrhythmia; Asthma; Autoimmune disease ([address] Cancer ([address] Chronic kidney disease; Chronic obstructive pulmonary disease ([address]  Chronic pain; Coagulation defects; Coagulation disorder ([facility] COPD; Diabetes ([facility] Difficult intubation; Endocarditis; Heart failure ([facility] Ill-defined condition; Liver disease; Malignant hyperthermia due to anesthesia; Nausea & vomiting; Non-nicotine vapor product user; Other ill-defined conditions(799.89); Pseudocholinesterase deficiency; Rheumatic fever; Seizures ([facility] [address] [facility] Stroke [facility]); or Thromboembolus ([facility] [address] [facility] Ms. Kyrie Stewart  has a past surgical history that includes hx hysterectomy; hx orthopaedic; hx orthopaedic; hx orthopaedic; hx heent; hx cataract removal; hx coronary stent placement (2012); hx heart catheterization (2013); and hx  section (, ). Social History/Living Environment:   Home Environment: Private residence  # Steps to Enter: 3  Rails to Enter: Yes  Hand Rails : Right  One/Two Story Residence: One story  Living Alone: No  Support Systems: Spouse/Significant Other/Partner  Patient Expects to be Discharged to[de-identified] Private residence  Current DME Used/Available at Home: Walker, rolling  Prior Level of Function/Work/Activity:  Independent      Number of Personal Factors/Comorbidities that affect the Plan of Care: Brief history (0):  LOW COMPLEXITY   ASSESSMENT OF OCCUPATIONAL PERFORMANCE[de-identified]   Most Recent Physical Functioning:   Balance  Sitting: Intact  Standing: Pull to stand; With support       Patient Vitals for the past 6 hrs:   BP BP Patient Position SpO2 O2 Flow Rate (L/min) Pulse   18 1001 118/70 Supine 98 % - 61   18 1006 120/74 - 100 % 3 l/min 60   18 1011 121/76 - 100 % 3 l/min (!) 54   18 1016 113/74 - 100 % 3 l/min (!) 51   18 1031 121/70 - 100 % 2 l/min (!) 48   18 1046 126/84 - 100 % 2 l/min (!) 50   18 1101 111/69 - 97 % 1 l/min (!) 54   18 1205 121/82 At rest 96 % - (!) 55   18 1425 - - 98 % 1 l/min -       Gross Assessment: Yes  Gross Assessment  AROM: Within functional limits (right LE)  Strength:  Within functional limits (right LE)  Coordination: Within functional limits (right LE)          LLE Assessment  LLE Assessment (WDL): Exception to WDL  LLE PROM  L Knee Flexion: 50 (~post op)  L Knee Extension: -20 (~post op) Coordination  Fine Motor Skills-Upper: Left Intact; Right Intact  Gross Motor Skills-Upper: Left Intact; Right Intact         Mental Status  Neurologic State: Alert  Orientation Level: Oriented X4  Cognition: Appropriate decision making  Perception: Appears intact  Perseveration: No perseveration noted  Safety/Judgement: Awareness of environment          RLE Assessment  RLE Assessment (WDL): Within defined limits     Basic ADLs (From Assessment) Complex ADLs (From Assessment)   Basic ADL  Feeding: Independent  Oral Facial Hygiene/Grooming: Supervision  Bathing: Moderate assistance  Upper Body Dressing: Supervision  Lower Body Dressing: Moderate assistance  Toileting: Moderate assistance     Grooming/Bathing/Dressing Activities of Daily Living     Cognitive Retraining  Safety/Judgement: Awareness of environment                 Functional Transfers  Toilet Transfer : Moderate assistance  Shower Transfer: Moderate assistance     Bed/Mat Mobility  Supine to Sit: Contact guard assistance  Sit to Supine: Contact guard assistance  Sit to Stand: Minimum assistance  Bed to Chair:  (NT)  Scooting: Contact guard assistance         Physical Skills Involved:  1. Range of Motion  2. Balance  3. Strength Cognitive Skills Affected (resulting in the inability to perform in a timely and safe manner): 1. none Psychosocial Skills Affected:  1. Environmental Adaptation   Number of elements that affect the Plan of Care: 3-5:  MODERATE COMPLEXITY   CLINICAL DECISION MAKIN \[facility] [place] [facility]\"" [address] AM-PAC 6 Clicks   Daily Activity Inpatient Short Form  How much help from another person does the patient currently need. .. Total A Lot A Little None   1. Putting on and taking off regular lower body clothing? [] 1   [x] 2   [] 3   [] 4   2. Bathing (including washing, rinsing, drying)? [] 1   [x] 2   [] 3   [] 4   3. Toileting, which includes using toilet, bedpan or urinal?   [] 1   [] 2   [x] 3   [] 4   4. Putting on and taking off regular upper body clothing? [] 1   [] 2   [] 3   [x] 4   5. Taking care of personal grooming such as brushing teeth? [] 1   [] 2   [] 3   [x] 4   6. Eating meals? [] 1   [] 2   [] 3   [x] 4   © 2007, Trustees of [employer], under license to [employer]. All rights reserved     Score:  Initial: 19 Most Recent: X (Date: -- )    Interpretation of Tool:  Represents activities that are increasingly more difficult (i.e. Bed mobility, Transfers, Gait). Score 24 23 22-20 19-15 14-10 9-7 6     Modifier CH CI CJ CK CL CM CN      ? Self Care:     - CURRENT STATUS: CK - 40%-59% impaired, limited or restricted    - GOAL STATUS: CJ - 20%-39% impaired, limited or restricted    - D/C STATUS:  ---------------To be determined---------------  Payor: JOSE ALEJANDRO / Plan: [address] [place] [address] Avenue / Product Type: PPO /      Medical Necessity:     · Patient is expected to demonstrate progress in range of motion, balance and functional technique to increase independence with self care. Reason for Services/Other Comments:  · Patient would benefit from skilled Occupational Therapy to maximize independence and safety with self-care task and functional mobility. .   Use of outcome tool(s) and clinical judgement create a POC that gives a: LOW COMPLEXITY            TREATMENT:   (In addition to Assessment/Re-Assessment sessions the following treatments were rendered)     Pre-treatment Symptoms/Complaints:  Pt without complaint of pain   Pain: Initial:     0 Post Session:  0     Assessment/Reassessment only, no treatment provided today    Treatment/Session Assessment:     Response to Treatment:  Pt up to edge of bed and tolerated well.     Education:  [] Home Exercises  [x] Fall Precautions  [] Hip Precautions [] Going Home Video  [x] Knee/Hip Prosthesis Review  [x] Walker Management/Safety [x] Adaptive Equipment as Needed       Interdisciplinary Collaboration:   o Physical Therapist  o Occupational Therapist  o Registered Nurse    After treatment position/precautions:   o Supine in bed  o Bed/Chair-wheels locked  o Call light within reach  o RN notified  o Family at bedside     Compliance with Program/Exercises: compliant all of the time. Recommendations/Intent for next treatment session:  Treatment next visit will focus on increasing Ms. Rolando's independence with bed mobility, transfers, self care, functional mobility, modalities for pain, and patient education.       Total Treatment Duration:  OT Patient Time In/Time Out  Time In: 1320  Time Out: Kelsey Hickey [address]

## 2018-01-12 NOTE — PERIOP NOTES
Dr Rebecca Lau notified of patient with heart stent and that she hasn't taken her aspirin in 4-5 days. Verbal order for patient to take her aspirin 81 mg. Read back verbal and confirmed. Patient took her own aspirin 81 mg with a sip of water.

## 2018-01-12 NOTE — IP AVS SNAPSHOT
[address] [place]  
[phone] Patient: Gilbert Goldsmith MRN: [id number] MHB:[birth year]01 A check tato indicates which time of day the medication should be taken. My Medications START taking these medications Instructions Each Dose to Equal  
 Morning Noon Evening Bedtime  
 oxyCODONE-acetaminophen 7.5-325 mg per tablet Commonly known as:  PERCOCET 7.5 Your last dose was: Your next dose is: Take 1 Tab by mouth every four (4) hours as needed. Max Daily Amount: 6 Tabs. 1 Tab CHANGE how you take these medications Instructions Each Dose to Equal  
 Morning Noon Evening Bedtime  
 aspirin delayed-release 325 mg tablet What changed:   
- medication strength 
- how much to take - when to take this Your last dose was: Your next dose is: Take 1 Tab by mouth every twelve (12) hours every twelve (12) hours. 325 mg  
    
   
   
   
  
 nebivolol 10 mg tablet Commonly known as:  BYSTOLIC What changed:  when to take this Your last dose was: Your next dose is: Take 1 Tab by mouth daily. Indications: hypertension 10 mg CONTINUE taking these medications Instructions Each Dose to Equal  
 Morning Noon Evening Bedtime  
 ascorbic acid (vitamin C) 500 mg tablet Commonly known as:  VITAMIN C Your last dose was: Your next dose is: Take 1,000 mg by mouth daily. 1000 mg  
    
   
   
   
  
 biotin 10,000 mcg Cap Your last dose was: Your next dose is: Take 1 Cap by mouth daily. 1 Cap COCONUT OIL PO Your last dose was: Your next dose is: Take 1,000 mg by mouth daily. 1000 mg  
    
   
   
   
  
 cyanocobalamin 1,000 mcg tablet Your last dose was: Your next dose is: Take 1,000 mcg by mouth daily. 1000 mcg CYMBALTA 60 mg capsule Generic drug:  DULoxetine Your last dose was: Your next dose is: Take 60 mg by mouth daily. Take / use AM day of surgery  per anesthesia protocols. Indications: ANXIETY WITH DEPRESSION  
 60 mg FISH -160-1,000 mg Cap Generic drug:  omega 3-dha-epa-fish oil Your last dose was: Your next dose is: Take 1 Cap by mouth daily. 1 Cap  
    
   
   
   
  
 glucosamine 1,000 mg Tab Your last dose was: Your next dose is: Take 2,000 mg by mouth two (2) times a day. 2000 mg  
    
   
   
   
  
 hydroCHLOROthiazide 25 mg tablet Commonly known as:  HYDRODIURIL Your last dose was: Your next dose is: Take 25 mg by mouth daily. Indications: hypertension 25 mg  
    
   
   
   
  
 L GLUTAMINE-N ACET-ARG-VIT-MIN PO Your last dose was: Your next dose is: Take 1,000 mg by mouth daily. 1000 mg  
    
   
   
   
  
 losartan 100 mg tablet Commonly known as:  COZAAR Your last dose was: Your next dose is: TAKE 1 TABLET BY MOUTH EVERY DAY  
     
   
   
   
  
 nitroglycerin 0.4 mg SL tablet Commonly known as:  NITROSTAT Your last dose was: Your next dose is:    
   
   
 1 Tab by SubLINGual route every five (5) minutes as needed for Chest Pain. 0.4 mg PROBIOTIC 4X 10-15 mg Tbec Generic drug:  B.infantis-B.ani-B.long-B.bifi Your last dose was: Your next dose is: Take 1 Tab by mouth two (2) times a day. 1 Tab  
    
   
   
   
  
 raNITIdine 150 mg tablet Commonly known as:  ZANTAC Your last dose was: Your next dose is: Take 150 mg by mouth nightly. Indications: gastroesophageal reflux disease  150 mg  
 SYNTHROID 150 mcg tablet Generic drug:  levothyroxine Your last dose was: Your next dose is: Take 150 mcg by mouth Daily (before breakfast). Take / use AM day of surgery  per anesthesia protocols. Indications: hypothyroidism 150 mcg TURMERIC ROOT EXTRACT PO Your last dose was: Your next dose is: Take 400 mg by mouth daily. 400 mg  
    
   
   
   
  
 VITAMIN D3 1,000 unit tablet Generic drug:  cholecalciferol Your last dose was: Your next dose is: Take 2,000 Units by mouth daily. 2000 Units ZETIA 10 mg tablet Generic drug:  ezetimibe Your last dose was: Your next dose is: Take 10 mg by mouth nightly. Indications: hypercholesterolemia 10 mg  
    
   
   
   
  
  
STOP taking these medications   
 diclofenac EC 75 mg EC tablet Commonly known as:  VOLTAREN  
   
  
 methylPREDNISolone 4 mg tablet Commonly known as:  Pranay Guzman Where to Get Your Medications These medications were sent to [facility]/pharmacy #[id number] [place] [address] Phone:  [phone]  
  aspirin delayed-release 325 mg tablet Information on where to get these meds will be given to you by the nurse or doctor. ! Ask your nurse or doctor about these medications  
  oxyCODONE-acetaminophen 7.5-325 mg per tablet

## 2018-01-12 NOTE — ANESTHESIA PROCEDURE NOTES
Peripheral Block    Start time: 1/12/2018 6:58 AM  End time: 1/12/2018 7:02 AM  Performed by: Marita Saha  Authorized by: Marita Saha       Pre-procedure: Indications: at surgeon's request, post-op pain management and procedure for pain    Preanesthetic Checklist: patient identified, risks and benefits discussed, site marked, timeout performed, anesthesia consent given and patient being monitored    Timeout Time: 06:58          Block Type:   Block Type:   Adductor canal  Laterality:  Left  Monitoring:  Standard ASA monitoring, responsive to questions, oxygen, continuous pulse ox, frequent vital sign checks and heart rate  Injection Technique:  Single shot  Procedures: ultrasound guided    Patient Position: supine  Prep: chlorhexidine    Location:  Mid thigh  Needle Type:  Stimuplex  Needle Gauge:  22 G  Needle Localization:  Ultrasound guidance  Medication Injected:  0.5%  ropivacaine  Volume (mL):  20    Assessment:  Number of attempts:  1  Injection Assessment:  Incremental injection every 5 mL, no paresthesia, ultrasound image on chart, no intravascular symptoms, negative aspiration for blood and local visualized surrounding nerve on ultrasound  Patient tolerance:  Patient tolerated the procedure well with no immediate complications

## 2018-01-12 NOTE — PERIOP NOTES
TRANSFER - IN REPORT:    Verbal report received from [address], [facility] (name) on Arlen Veronica  being received from [facility] [place] (unit) for routine progression of care      Report consisted of patients Situation, Background, Assessment and   Recommendations(SBAR). Information from the following report(s) SBAR, Kardex, Intake/Output, MAR, Recent Results and Med Rec Status was reviewed with the receiving nurse. Opportunity for questions and clarification was provided.

## 2018-01-12 NOTE — PERIOP NOTES
TRANSFER - OUT REPORT:    Verbal report given to receiving nurse Eun(name) on Floyd Dooley  being transferred to [employer] [facility] [employer](unit) for routine progression of care       Report consisted of patients Situation, Background, Assessment and   Recommendations(SBAR). Information from the following report(s) OR Summary, Procedure Summary, Intake/Output and MAR was reviewed with the receiving nurse. Opportunity for questions and clarification was provided.       Patient transported with:   O2 @ 1 liters  Tech

## 2018-01-12 NOTE — ANESTHESIA PROCEDURE NOTES
Spinal Block    Start time: 1/12/2018 8:00 AM  End time: 1/12/2018 8:05 AM  Performed by: Adán Parrish  Authorized by: Adán Parrish     Pre-procedure:   Indications: at surgeon's request, post-op pain management, procedure for pain and primary anesthetic  Preanesthetic Checklist: patient identified, risks and benefits discussed, anesthesia consent, site marked, patient being monitored and timeout performed    Timeout Time: 08:00          Spinal Block:   Patient Position:  Seated  Prep Region:  Lumbar  Prep: chlorhexidine      Location:  L3-4  Technique:  Single shot  Local:  Lidocaine 1%      Needle:   Needle Type:  Pencan  Needle Gauge:  25 G  Attempts:  1      Events: CSF confirmed, no blood with aspiration and no paresthesia        Assessment:  Insertion:  Uncomplicated  Patient tolerance:  Patient tolerated the procedure well with no immediate complications  Bupiv 3.13% 1.8cc

## 2018-01-12 NOTE — IP AVS SNAPSHOT
[address] 
[phone] Patient: Ksenia Caldwell MRN: [id number] About your hospitalization You were admitted on:  January 12, 2018 You last received care in the:  [facility] You were discharged on:  January 13, 2018 Why you were hospitalized Your primary diagnosis was:  Not on File Your diagnoses also included:  Knee Arthropathy Follow-up Information Follow up With Details Comments Contact Info MD Lindsey Crenshaw [facility] [id number] 
[phone] Discharge Orders None A check tato indicates which time of day the medication should be taken. My Medications START taking these medications Instructions Each Dose to Equal  
 Morning Noon Evening Bedtime  
 oxyCODONE-acetaminophen 7.5-325 mg per tablet Commonly known as:  PERCOCET 7.5 Your last dose was: Your next dose is: Take 1 Tab by mouth every four (4) hours as needed. Max Daily Amount: 6 Tabs. 1 Tab CHANGE how you take these medications Instructions Each Dose to Equal  
 Morning Noon Evening Bedtime  
 aspirin delayed-release 325 mg tablet What changed:   
- medication strength 
- how much to take - when to take this Your last dose was: Your next dose is: Take 1 Tab by mouth every twelve (12) hours every twelve (12) hours. 325 mg  
    
   
   
   
  
 nebivolol 10 mg tablet Commonly known as:  BYSTOLIC What changed:  when to take this Your last dose was: Your next dose is: Take 1 Tab by mouth daily. Indications: hypertension 10 mg CONTINUE taking these medications Instructions Each Dose to Equal  
 Morning Noon Evening Bedtime  
 ascorbic acid (vitamin C) 500 mg tablet Commonly known as:  VITAMIN C  
   
 Your last dose was: Your next dose is: Take 1,000 mg by mouth daily. 1000 mg  
    
   
   
   
  
 biotin 10,000 mcg Cap Your last dose was: Your next dose is: Take 1 Cap by mouth daily. 1 Cap COCONUT OIL PO Your last dose was: Your next dose is: Take 1,000 mg by mouth daily. 1000 mg  
    
   
   
   
  
 cyanocobalamin 1,000 mcg tablet Your last dose was: Your next dose is: Take 1,000 mcg by mouth daily. 1000 mcg CYMBALTA 60 mg capsule Generic drug:  DULoxetine Your last dose was: Your next dose is: Take 60 mg by mouth daily. Take / use AM day of surgery  per anesthesia protocols. Indications: ANXIETY WITH DEPRESSION  
 60 mg FISH -160-1,000 mg Cap Generic drug:  omega 3-dha-epa-fish oil Your last dose was: Your next dose is: Take 1 Cap by mouth daily. 1 Cap  
    
   
   
   
  
 glucosamine 1,000 mg Tab Your last dose was: Your next dose is: Take 2,000 mg by mouth two (2) times a day. 2000 mg  
    
   
   
   
  
 hydroCHLOROthiazide 25 mg tablet Commonly known as:  HYDRODIURIL Your last dose was: Your next dose is: Take 25 mg by mouth daily. Indications: hypertension 25 mg  
    
   
   
   
  
 L GLUTAMINE-N ACET-ARG-VIT-MIN PO Your last dose was: Your next dose is: Take 1,000 mg by mouth daily. 1000 mg  
    
   
   
   
  
 losartan 100 mg tablet Commonly known as:  COZAAR Your last dose was: Your next dose is: TAKE 1 TABLET BY MOUTH EVERY DAY  
     
   
   
   
  
 nitroglycerin 0.4 mg SL tablet Commonly known as:  NITROSTAT Your last dose was: Your next dose is: 1 Tab by SubLINGual route every five (5) minutes as needed for Chest Pain. 0.4 mg PROBIOTIC 4X 10-15 mg Tbec Generic drug:  B.infantis-B.ani-B.long-B.bifi Your last dose was: Your next dose is: Take 1 Tab by mouth two (2) times a day. 1 Tab  
    
   
   
   
  
 raNITIdine 150 mg tablet Commonly known as:  ZANTAC Your last dose was: Your next dose is: Take 150 mg by mouth nightly. Indications: gastroesophageal reflux disease 150 mg SYNTHROID 150 mcg tablet Generic drug:  levothyroxine Your last dose was: Your next dose is: Take 150 mcg by mouth Daily (before breakfast). Take / use AM day of surgery  per anesthesia protocols. Indications: hypothyroidism 150 mcg TURMERIC ROOT EXTRACT PO Your last dose was: Your next dose is: Take 400 mg by mouth daily. 400 mg  
    
   
   
   
  
 VITAMIN D3 1,000 unit tablet Generic drug:  cholecalciferol Your last dose was: Your next dose is: Take 2,000 Units by mouth daily. 2000 Units ZETIA 10 mg tablet Generic drug:  ezetimibe Your last dose was: Your next dose is: Take 10 mg by mouth nightly. Indications: hypercholesterolemia 10 mg  
    
   
   
   
  
  
STOP taking these medications   
 diclofenac EC 75 mg EC tablet Commonly known as:  VOLTAREN  
   
  
 methylPREDNISolone 4 mg tablet Commonly known as:  Blayne Cronin Where to Get Your Medications These medications were sent to [facility]/pharmacy #[id number] [place] [id number] [facility] [address] Phone:  [phone]  
  aspirin delayed-release 325 mg tablet Information on where to get these meds will be given to you by the nurse or doctor. ! Ask your nurse or doctor about these medications  
  oxyCODONE-acetaminophen 7.5-325 mg per tablet Discharge Instructions 1) KEEP YOUR APPOINTMENT WITH DR.WILLIAM MANCERA,,,IF NO APPOINTMENT MADE,,CALL THE OFFICE AT # [phone] TO GET ONE. .... 2) [facility] HAS BEEN ASSIGNED TO MAKE HOME VISITS FOR PATIENT PHYSICAL THERAPY,,,FOR WOUND CHECKS,,,DRESSING CHANGES. Roe [place]l .. CONTACT NUMBER # [phone]. .................. Roe [place]l Total Knee Replacement: What to Expect at Home Your Recovery When you leave the hospital, you should be able to move around with a walker or crutches. But you will need someone to help you at home for the next few weeks or until you have more energy and can move around better. If there is no one to help you at home, you may go to a rehabilitation center. You will go home with a bandage and stitches or staples. Change the bandage as your doctor tells you to. Your doctor will remove your stitches or staples 10 to 21 days after your surgery. You may still have some mild pain, and the area may be swollen for 3 to 6 months after surgery. Your knee will continue to improve for 6 to 12 months. You will probably use a walker for 1 to 3 weeks and then use crutches. When you are ready, you can use a cane. You will probably be able to walk on your own in 4 to 8 weeks. You will need to do months of physical rehabilitation (rehab) after a knee replacement. Rehab will help you strengthen the muscles of the knee and help you regain movement. After you recover, your artificial knee will allow you to do normal daily activities with less pain or no pain at all. You may be able to hike, dance, ride a bike, and play golf. Talk to your doctor about whether you can do more strenuous activities. Always tell your caregivers that you have an artificial knee. How long it will take to walk on your own, return to normal activities, and go back to work depends on your health and how well your rehabilitation (rehab) program goes. The better you do with your rehab exercises, the quicker you will get your strength and movement back. This care sheet gives you a general idea about how long it will take for you to recover. But each person recovers at a different pace. Follow the steps below to get better as quickly as possible. How can you care for yourself at home? Activity ? · Rest when you feel tired. You may take a nap, but do not stay in bed all day. When you sit, use a chair with arms. You can use the arms to help you stand up. ? · Work with your physical therapist to find the best way to exercise. You may be able to take frequent, short walks using crutches or a walker. What you can do as your knee heals will depend on whether your new knee is cemented or uncemented. You may not be able to do certain things for a while if your new knee is uncemented. ? · After your knee has healed enough, you can do more strenuous activities with caution. ¨ You can golf, but use a golf cart, and do not wear shoes with spikes. ¨ You can bike on a flat road or on a stationary bike. Avoid biking up hills. ¨ Your doctor may suggest that you stay away from activities that put stress on your knee. These include tennis or badminton, squash or racquetball, contact sports like football, jumping (such as in basketball), jogging, or running. ¨ Avoid activities where you might fall. These include horseback riding, skiing, and mountain biking. ? · Do not sit for more than 1 hour at a time. Get up and walk around for a while before you sit again. If you must sit for a long time, prop up your leg with a chair or footstool. This will help you avoid swelling. ? · Ask your doctor when you can shower. You may need to take sponge baths until your stitches or staples have been removed. ? · Ask your doctor when you can drive again. It may take up to 8 weeks after knee replacement surgery before it is safe for you to drive. ? · When you get into a car, sit on the edge of the seat. Then pull in your legs, and turn to face the front. ? · You should be able to do many everyday activities 3 to 6 weeks after your surgery. You will probably need to take 4 to 16 weeks off from work. When you can go back to work depends on the type of work you do and how you feel. ? · Ask your doctor when it is okay for you to have sex. ? · Do not lift anything heavier than 10 pounds and do not lift weights for 12 weeks. Diet ? · By the time you leave the hospital, you should be eating your normal diet. If your stomach is upset, try bland, low-fat foods like plain rice, broiled chicken, toast, and yogurt. Your doctor may suggest that you take iron and vitamin supplements. ? · Drink plenty of fluids (unless your doctor tells you not to). ? · Eat healthy foods, and watch your portion sizes. Try to stay at your ideal weight. Too much weight puts more stress on your new knee. ? · You may notice that your bowel movements are not regular right after your surgery. This is common. Try to avoid constipation and straining with bowel movements. You may want to take a fiber supplement every day. If you have not had a bowel movement after a couple of days, ask your doctor about taking a mild laxative. Medicines ? · Your doctor will tell you if and when you can restart your medicines. He or she will also give you instructions about taking any new medicines. ? · If you take blood thinners, such as warfarin (Coumadin), clopidogrel (Plavix), or aspirin, be sure to talk to your doctor. He or she will tell you if and when to start taking those medicines again.  Make sure that you understand exactly what your doctor wants you to do.  
? · Your doctor may give you a blood-thinning medicine to prevent blood clots. If you take a blood thinner, be sure you get instructions about how to take your medicine safely. Blood thinners can cause serious bleeding problems. This medicine could be in pill form or as a shot (injection). If a shot is necessary, your doctor will tell you how to do this. ? · Be safe with medicines. Take pain medicines exactly as directed. ¨ If the doctor gave you a prescription medicine for pain, take it as prescribed. ¨ If you are not taking a prescription pain medicine, ask your doctor if you can take an over-the-counter medicine. ¨ Plan to take your pain medicine 30 minutes before exercises. It is easier to prevent pain before it starts than to stop it once it has started. ? · If you think your pain medicine is making you sick to your stomach: 
¨ Take your medicine after meals (unless your doctor has told you not to). ¨ Ask your doctor for a different pain medicine. ? · If your doctor prescribed antibiotics, take them as directed. Do not stop taking them just because you feel better. You need to take the full course of antibiotics. Incision care ? · You will have a bandage over the cut (incision) and staples or stitches. Take the bandage off when your doctor says it is okay. ? · Your doctor will remove the staples or stitches 10 days to 3 weeks after the surgery and replace them with strips of tape. Leave the tape on for a week or until it falls off. Exercise ? · Your rehab program will give you a number of exercises to do to help you get back your knee's range of motion and strength. Always do them as your therapist tells you. Ice and elevation ? · For pain and swelling, put ice or a cold pack on the area for 10 to 20 minutes at a time. Put a thin cloth between the ice and your skin. Other instructions ? · Continue to wear your support stockings as your doctor says. These help to prevent blood clots.  The length of time that you will have to wear them depends on your activity level and the amount of swelling. ? · You have metal pieces in your knee. These may set off some airport metal detectors. Carry a medical alert card that says you have an artificial joint, just in case. Follow-up care is a key part of your treatment and safety. Be sure to make and go to all appointments, and call your doctor if you are having problems. It's also a good idea to know your test results and keep a list of the medicines you take. When should you call for help? Call 911 anytime you think you may need emergency care. For example, call if: 
? · You passed out (lost consciousness). ? · You have severe trouble breathing. ? · You have sudden chest pain and shortness of breath, or you cough up blood. ?Call your doctor now or seek immediate medical care if: 
? · You have signs of infection, such as: 
¨ Increased pain, swelling, warmth, or redness. ¨ Red streaks leading from the incision. ¨ Pus draining from the incision. ¨ A fever. ? · You have signs of a blood clot, such as: 
¨ Pain in your calf, back of the knee, thigh, or groin. ¨ Redness and swelling in your leg or groin. ? · Your incision comes open and begins to bleed, or the bleeding increases. ? · You have pain that does not get better after you take pain medicine. ? Watch closely for changes in your health, and be sure to contact your doctor if: 
? · You do not have a bowel movement after taking a laxative. Where can you learn more? Go to http://harley-pedro.info/. Enter E292 in the search box to learn more about \"Total Knee Replacement: What to Expect at Home. \" Current as of: March 21, 2017 Content Version: 11.4 © 7427-4028 [employer]. Care instructions adapted under license by [employer] (which disclaims liability or warranty for this information).  If you have questions about a medical condition or this instruction, always ask your healthcare professional. [place] [address] any warranty or liability for your use of this information. Introducing [facility]! Carmelita Fothergill introduces [employer] patient portal. Now you can access parts of your medical record, email your doctor's office, and request medication refills online. 1. In your internet browser, go to [email or web address] 2. Click on the First Time User? Click Here link in the Sign In box. You will see the New Member Sign Up page. 3. Enter your [employer] Access Code exactly as it appears below. You will not need to use this code after youve completed the sign-up process. If you do not sign up before the expiration date, you must request a new code. · [employer] Access Code: [email or web address] Expires: 2/14/2018  9:38 AM 
 
4. Enter the last four digits of your Social Security Number (xxxx) and Date of Birth (mm/dd/yyyy) as indicated and click Submit. You will be taken to the next sign-up page. 5. Create a [employer] ID. This will be your [employer] login ID and cannot be changed, so think of one that is secure and easy to remember. 6. Create a [employer] password. You can change your password at any time. 7. Enter your Password Reset Question and Answer. This can be used at a later time if you forget your password. 8. Enter your e-mail address. You will receive e-mail notification when new information is available in [address]. 9. Click Sign Up. You can now view and download portions of your medical record. 10. Click the Download Summary menu link to download a portable copy of your medical information. If you have questions, please visit the Frequently Asked Questions section of the [employer] website. Remember, [employer] is NOT to be used for urgent needs. For medical emergencies, dial 911. Now available from your iPhone and Android! Providers Seen During Your Hospitalization Provider Specialty Primary office phone Ferdinand Harada, MD Orthopedic Surgery [phone] Immunizations Administered for This Admission Name Date  
 TB Skin Test (PPD) Intradermal 1/12/2018 Your Primary Care Physician (PCP) Primary Care Physician Office Phone Office Fax [place], [address] You are allergic to the following Allergen Reactions Codeine Itching Macrobid (Nitrofurantoin Monohyd/M-Cryst) Itching Tramadol Itching Recent Documentation Height Weight Breastfeeding? BMI OB Status Smoking Status 1.676 m 107 kg No 38.07 kg/m2 Hysterectomy Former Smoker Emergency Contacts Name Discharge Info Relation Home Work Mobile [address] CAREGIVER [3] Spouse [3]   [phone] Nereyda Fernandes  Daughter [21]   [phone] Patient Belongings The following personal items are in your possession at time of discharge: 
  Dental Appliances: Uppers, With patient  Visual Aid: None      Home Medications: Kept at bedside   Jewelry: Bracelet (on patient/ unable to remove)  Clothing: At bedside    Other Valuables: Cell Phone ( to care for) Please provide this summary of care documentation to your next provider. Signatures-by signing, you are acknowledging that this After Visit Summary has been reviewed with you and you have received a copy. Patient Signature:  ____________________________________________________________ Date:  ____________________________________________________________  
  
Gasper November Provider Signature:  ____________________________________________________________ Date:  ____________________________________________________________

## 2018-01-12 NOTE — OP NOTES
[facility]  OPERATIVE REPORT    Satya Canseco  MR#: [id number]  : 1960  ACCOUNT #: [de-identified]   DATE OF SERVICE: 2018    PREOPERATIVE DIAGNOSIS:  Left knee osteoarthritis with a NICKEL ALLERGY. POSTOPERATIVE DIAGNOSIS:  Left knee osteoarthritis with a NICKEL ALLERGY. PROCEDURES PERFORMED:  Left total knee arthroplasty. SURGEON:  Dr. Kaden Nguyễn. ANESTHESIA:  Spinal with adductor canal block. IMPLANTS:  Nickerson and Nephew size 5 Oxinium distal femur, size 4 tibia, 11 mm XLPE posterior stabilized polyethylene insert. ESTIMATED BLOOD LOSS:  Approximately 150 mL. MEDICATIONS  1. Tranexamic acid IV protocol. 2.  Naropin intraarticular cocktail. COMPLICATIONS:  None. SPECIMENS REMOVED:  Resected bone. DESCRIPTION OF PROCEDURE:  After discussion of risks, benefits and alternatives, the patient expressed understanding and strongly desired to proceed with surgical treatment. She is brought to the preoperative holding area where an adductor canal block was performed. She was then brought to the operating room. Spinal anesthesia was administered by the anesthesia team and a Hurt catheter was placed. The left lower extremity was then prepped and draped in normal sterile fashion. Time out was then performed. Mid vastus approach was then performed. Tricompartmental osteoarthritis was demonstrated, which was most severe in the medial compartment consistent with preoperative imaging. Intramedullary alignment was used to perform the distal femoral resection. Extramedullary alignment was used to form the tibial resection. Extension gap balancing was then performed. Flexion gap balancing was then used to set the femoral rotation and size the distal femur. A size 5 femur was selected. The tibia was sized and found to be consistent with a size 4. Anterior and posterior and chamfer cuts were then performed. Soft tissue resections were performed. Posterior stabilized box was prepared for the femur. A tibial fin punch was performed. The patella was inspected. Due to the morphology of the patella, the limited patellofemoral symptoms preoperatively, and the patient's obesity, I elected not to resurface the patella. It was peripherally denervated. The limb was then exsanguinated and tourniquet was inflated. The wound was copiously irrigated and dried. Polymethyl methacrylate cement was mixed on the back table and then used to coat the bone implant interface as a size 4 left tibia and size 5 Oxinium posterior stabilized left femur were implanted. Trial polyethylene was placed and the knee was held in extension as the cement hardened. Extruded cement was removed from the operative field. The wound was copiously irrigated at length. There is minimal active bleeding. The knee was appropriately balanced with full stability and range of motion with an 11 mm polyethylene. The polyethylene was then removed and a permanent 11 mm polyethylene was placed. The wound was copiously irrigated at length and a layered wound closure was performed. Intra-articular injection with Naropin cocktail was performed. A cosmetic skin closure was accomplished. Dilute Betadine lavage was performed. Sterile dressing was then applied. The patient tolerated the procedure well. There were no complications. Resected bone was sent for routine handling. POSTOPERATIVE PLAN:  Weightbear as tolerated. Standard postoperative antibiotic and DVT prophylaxis, aspirin 325 mg twice daily for DVT prophylaxis.       MD ELEONORA Burrell / NICHELLE  D: 01/12/2018 10:04     T: 01/12/2018 10:30  JOB #: [id number]  CC: Re Lopez MD

## 2018-01-12 NOTE — PROGRESS NOTES
Problem: Mobility Impaired (Adult and Pediatric)  Goal: *Acute Goals and Plan of Care (Insert Text)  GOALS (1-4 days):  (1.)Ms. Kang Spear will move from supine to sit and sit to supine  in bed with SUPERVISION. (2.)Ms. Kang Spear will transfer from bed to chair and chair to bed with STAND BY ASSIST using the least restrictive device. (3.)Ms. Kang Spear will ambulate with STAND BY ASSIST for 200 feet with the least restrictive device. (4.)Ms. Kang Spear will ambulate up/down 3 steps with right railing with CONTACT GUARD ASSIST with no device. (5.)Ms. Kang Spear will increase left knee ROM to 5°-80°.  ________________________________________________________________________________________________    PHYSICAL THERAPY [facility]: Initial Assessment, Treatment Day: Day of Assessment, PM 1/12/2018  OBSERVATION: Hospital Day: 1  Payor: Mala Edhub / Plan: Jose Antonio Asif / Product Type: PPO /      NAME/AGE/GENDER: Clara Grider is a 59 y.o. female   PRIMARY DIAGNOSIS:  Acute pain of left knee [M25.562]  Unilateral primary osteoarthritis, left knee [M17.12]   Procedure(s) and Anesthesia Type:     * LEFT KNEE ARTHROPLASTY TOTAL / LEFT - Spinal (Left)  ICD-10: Treatment Diagnosis:    · Pain in Left Knee (M25.562)  · Stiffness of Left Knee, Not elsewhere classified (M25.662)  · Difficulty in walking, Not elsewhere classified (R26.2)      ASSESSMENT:     Ms. Kang Spear presents with impaired strength & mobility s/p left TKA. Pt also had decreased stability during out of bed activity. Pt will benefit from follow up therapy to help restore safe function prior to returning home with caregiver. This section established at most recent assessment   PROBLEM LIST (Impairments causing functional limitations):  1. Decreased Strength  2. Decreased ADL/Functional Activities  3. Decreased Transfer Abilities  4. Decreased Ambulation Ability/Technique  5. Decreased Balance  6. Increased Pain  7. Decreased Activity Tolerance  8.  Decreased Flexibility/Joint Mobility  9. Decreased Andrews with Home Exercise Program   INTERVENTIONS PLANNED: (Benefits and precautions of physical therapy have been discussed with the patient.)  1. Bed Mobility  2. Gait Training  3. Home Exercise Program (HEP)  4. Therapeutic Exercise/Strengthening  5. Transfer Training  6. Range of Motion: active/assisted/passive  7. Therapeutic Activities  8. Group Therapy     TREATMENT PLAN: Frequency/Duration: Follow patient BID   to address above goals. Rehabilitation Potential For Stated Goals: Good     RECOMMENDED REHABILITATION/EQUIPMENT: (at time of discharge pending progress): Continue Skilled Therapy and Home Health: Physical Therapy. HISTORY:   History of Present Injury/Illness (Reason for Referral):  Left TKA  Past Medical History/Comorbidities:   Ms. Lauro Samaniego  has a past medical history of Arthritis; CAD (coronary artery disease); Former cigarette smoker; GERD (gastroesophageal reflux disease); Hypertension; Morbid obesity ([address] Nicotine vapor product user; Other chest pain (11/16/2017); Palpitations (11/16/2017); Psychiatric disorder; PUD (peptic ulcer disease); Sleep apnea; and Thyroid disease. She also has no past medical history of Adverse effect of anesthesia; Aneurysm ([address] Arrhythmia; Asthma; Autoimmune disease ([address] Cancer ([address] Chronic kidney disease; Chronic obstructive pulmonary disease ([address] Chronic pain; Coagulation defects; Coagulation disorder ([address] COPD; Diabetes ([address] Difficult intubation; Endocarditis; Heart failure ([address] Ill-defined condition; Liver disease; Malignant hyperthermia due to anesthesia; Nausea & vomiting; Non-nicotine vapor product user; Other ill-defined conditions(799.89); Pseudocholinesterase deficiency; Rheumatic fever; Seizures ([address] Stroke [facility]); or Thromboembolus ([address]   Ms. Lauro Samaniego  has a past surgical history that includes hx hysterectomy; hx orthopaedic; hx orthopaedic; hx orthopaedic; hx heent; hx cataract removal; hx coronary stent placement (2012); hx heart catheterization (2013); and hx  section (, ). Social History/Living Environment:   Home Environment: Private residence  # Steps to Enter: 3  Rails to Enter: Yes  Hand Rails : Right  One/Two Story Residence: One story  Living Alone: No  Support Systems: Spouse/Significant Other/Partner  Patient Expects to be Discharged to[de-identified] Private residence  Current DME Used/Available at Home: Walker, rolling  Prior Level of Function/Work/Activity:  Pt was independent without an assistive device   Number of Personal Factors/Comorbidities that affect the Plan of Care: 3+: HIGH COMPLEXITY   EXAMINATION:   Most Recent Physical Functioning:   Gross Assessment: Yes  Gross Assessment  AROM: Within functional limits (right LE)  Strength: Within functional limits (right LE)  Coordination: Within functional limits (right LE)           LLE PROM  L Knee Flexion: 50 (~post op)  L Knee Extension: -20 (~post op)         Bed Mobility  Supine to Sit: Contact guard assistance  Sit to Supine: Contact guard assistance  Scooting: Contact guard assistance    Transfers  Sit to Stand: Minimum assistance  Stand to Sit: Minimum assistance  Bed to Chair:  (NT)    Balance  Sitting: Intact; Without support  Standing: Impaired; With support (with walker)              Weight Bearing Status  Left Side Weight Bearing: As tolerated  Distance (ft): 10 Feet (ft)  Ambulation - Level of Assistance: Minimal assistance  Assistive Device: Walker, rolling  Speed/Tiara: Shuffled; Slow  Step Length: Left shortened;Right shortened  Stance: Left decreased  Gait Abnormalities: Antalgic;Decreased step clearance        Braces/Orthotics: none    Left Knee Cold  Type: Cryocuff      Body Structures Involved:  1. Joints  2. Muscles Body Functions Affected:  1. Sensory/Pain  2. Movement Related Activities and Participation Affected:  1. General Tasks and Demands  2.  Mobility   Number of elements that affect the Plan of Care: 4+: HIGH COMPLEXITY   CLINICAL PRESENTATION:   Presentation: Stable and uncomplicated: LOW COMPLEXITY   CLINICAL DECISION MAKIN [facility] [address] [id number] AM-PAC 6 Clicks   Basic Mobility Inpatient Short Form  How much difficulty does the patient currently have. .. Unable A Lot A Little None   1. Turning over in bed (including adjusting bedclothes, sheets and blankets)? [] 1   [] 2   [x] 3   [] 4   2. Sitting down on and standing up from a chair with arms ( e.g., wheelchair, bedside commode, etc.)   [] 1   [] 2   [x] 3   [] 4   3. Moving from lying on back to sitting on the side of the bed? [] 1   [] 2   [x] 3   [] 4   How much help from another person does the patient currently need. .. Total A Lot A Little None   4. Moving to and from a bed to a chair (including a wheelchair)? [] 1   [] 2   [x] 3   [] 4   5. Need to walk in hospital room? [] 1   [] 2   [x] 3   [] 4   6. Climbing 3-5 steps with a railing? [x] 1   [] 2   [] 3   [] 4   © , Trustees of [address] [place] [address] [address] [address], under license to [employer]. All rights reserved        Score:  Initial: 16 Most Recent: X (Date: -- )    Interpretation of Tool:  Represents activities that are increasingly more difficult (i.e. Bed mobility, Transfers, Gait). Score 24 23 22-20 19-15 14-10 9-7 6     Modifier CH CI CJ CK CL CM CN      ? Mobility - Walking and Moving Around:     - CURRENT STATUS: CK - 40%-59% impaired, limited or restricted    - GOAL STATUS: CJ - 20%-39% impaired, limited or restricted    - D/C STATUS:  ---------------To be determined---------------  Payor: JOSE ALEJANDRO / Plan: Valeria yomaira / Product Type: PPO /      Medical Necessity:     · Patient is expected to demonstrate progress in strength, range of motion, balance, coordination and functional technique to decrease assistance required with bed mobility, transfers & gait.   Reason for Services/Other Comments:  · Patient continues to require skilled intervention due to pt not independent with functional mobility. Use of outcome tool(s) and clinical judgement create a POC that gives a: Clear prediction of patient's progress: LOW COMPLEXITY            TREATMENT:   (In addition to Assessment/Re-Assessment sessions the following treatments were rendered)     Pre-treatment Symptoms/Complaints:  none  Pain: Initial: visual scale  Pain Intensity 1: 0  Pain Location 1: Knee  Pain Orientation 1: Left  Pain Intervention(s) 1: Cold pack, Repositioned  Post Session:  0/10     Assessment/Reassessment only, no treatment provided today       Date:   Date:   Date:     ACTIVITY/EXERCISE AM PM AM PM AM PM   GROUP THERAPY  []  []  []  []  []  []   Ankle Pumps         Quad Sets         Gluteal Sets         Hip ABd/ADduction         Straight Leg Raises         Knee Slides         Short Arc Quads         Long Arc Quads         Chair Slides                  B = bilateral; AA = active assistive; A = active; P = passive      Treatment/Session Assessment:     Response to Treatment:  Tolerated well    Education:  [] Home Exercises  [x] Fall Precautions  [] Hip Precautions [] D/C Instruction Review  [] Knee/Hip Prosthesis Review  [x] Walker Management/Safety [] Adaptive Equipment as Needed       Interdisciplinary Collaboration:   o Occupational Therapist  o Registered Nurse    After treatment position/precautions:   o Supine in bed  o Bed/Chair-wheels locked  o Bed in low position  o Caregiver at bedside  o Call light within reach  o RN notified  o Family at bedside    Compliance with Program/Exercises: Will assess as treatment progresses. Recommendations/Intent for next treatment session:  Treatment next visit will focus on increasing Ms. Rolando's independence with bed mobility, transfers, gait training, strength/ROM exercises, modalities for pain, and patient education.       Total Treatment Duration:  PT Patient Time In/Time Out  Time In: 1305  Time Out: [address] [facility],[address], PT

## 2018-01-13 ENCOUNTER — HOME HEALTH ADMISSION (OUTPATIENT)
Dept: HOME HEALTH SERVICES | Facility: HOME HEALTH | Age: 58
End: 2018-01-13
Payer: COMMERCIAL

## 2018-01-13 VITALS
TEMPERATURE: 97.8 F | BODY MASS INDEX: 37.91 KG/M2 | HEIGHT: 66 IN | OXYGEN SATURATION: 92 % | WEIGHT: 235.89 LBS | SYSTOLIC BLOOD PRESSURE: 120 MMHG | RESPIRATION RATE: 18 BRPM | DIASTOLIC BLOOD PRESSURE: 78 MMHG | HEART RATE: 62 BPM

## 2018-01-13 LAB
ANION GAP SERPL CALC-SCNC: 2 MMOL/L (ref 7–16)
BUN SERPL-MCNC: 17 MG/DL (ref 6–23)
CALCIUM SERPL-MCNC: 7.9 MG/DL (ref 8.3–10.4)
CHLORIDE SERPL-SCNC: 106 MMOL/L (ref 98–107)
CO2 SERPL-SCNC: 33 MMOL/L (ref 21–32)
CREAT SERPL-MCNC: 0.95 MG/DL (ref 0.6–1)
GLUCOSE SERPL-MCNC: 114 MG/DL (ref 65–100)
HGB BLD-MCNC: 10.7 G/DL (ref 11.7–15.4)
MM INDURATION POC: 0 MM (ref 0–5)
POTASSIUM SERPL-SCNC: 4 MMOL/L (ref 3.5–5.1)
PPD POC: NORMAL NEGATIVE
SODIUM SERPL-SCNC: 141 MMOL/L (ref 136–145)

## 2018-01-13 PROCEDURE — 97150 GROUP THERAPEUTIC PROCEDURES: CPT

## 2018-01-13 PROCEDURE — 97535 SELF CARE MNGMENT TRAINING: CPT

## 2018-01-13 PROCEDURE — G8980 MOBILITY D/C STATUS: HCPCS

## 2018-01-13 PROCEDURE — G8979 MOBILITY GOAL STATUS: HCPCS

## 2018-01-13 PROCEDURE — 97116 GAIT TRAINING THERAPY: CPT

## 2018-01-13 PROCEDURE — 74011250637 HC RX REV CODE- 250/637: Performed by: ORTHOPAEDIC SURGERY

## 2018-01-13 PROCEDURE — 97110 THERAPEUTIC EXERCISES: CPT

## 2018-01-13 PROCEDURE — 85018 HEMOGLOBIN: CPT | Performed by: ORTHOPAEDIC SURGERY

## 2018-01-13 PROCEDURE — G0378 HOSPITAL OBSERVATION PER HR: HCPCS

## 2018-01-13 PROCEDURE — 99218 HC RM OBSERVATION: CPT

## 2018-01-13 PROCEDURE — 36415 COLL VENOUS BLD VENIPUNCTURE: CPT | Performed by: ORTHOPAEDIC SURGERY

## 2018-01-13 PROCEDURE — 80048 BASIC METABOLIC PNL TOTAL CA: CPT | Performed by: ORTHOPAEDIC SURGERY

## 2018-01-13 RX ADMIN — ACETAMINOPHEN 1000 MG: 500 TABLET, FILM COATED ORAL at 13:00

## 2018-01-13 RX ADMIN — DULOXETINE HYDROCHLORIDE 60 MG: 60 CAPSULE, DELAYED RELEASE ORAL at 08:08

## 2018-01-13 RX ADMIN — OXYCODONE HYDROCHLORIDE AND ACETAMINOPHEN 1 TABLET: 7.5; 325 TABLET ORAL at 10:21

## 2018-01-13 RX ADMIN — ASPIRIN 325 MG: 325 TABLET, DELAYED RELEASE ORAL at 08:08

## 2018-01-13 RX ADMIN — OXYCODONE HYDROCHLORIDE AND ACETAMINOPHEN 1 TABLET: 7.5; 325 TABLET ORAL at 05:23

## 2018-01-13 RX ADMIN — ACETAMINOPHEN 1000 MG: 500 TABLET, FILM COATED ORAL at 05:23

## 2018-01-13 RX ADMIN — CELECOXIB 200 MG: 200 CAPSULE ORAL at 08:07

## 2018-01-13 RX ADMIN — SENNOSIDES AND DOCUSATE SODIUM 2 TABLET: 8.6; 5 TABLET ORAL at 08:05

## 2018-01-13 RX ADMIN — HYDROCHLOROTHIAZIDE 25 MG: 25 TABLET ORAL at 08:08

## 2018-01-13 NOTE — PROGRESS NOTES
2018         Post Op day: 1 Day Post-Op     Admit Date: 2018  Admit Diagnosis: Acute pain of left knee [M25.562]  Unilateral primary osteoarthritis, left knee [M17.12]        Subjective: Doing well, No complaints, No SOB, No Chest Pain, No Nausea or Vomiting     Objective:   Vital Signs are Stable, No Acute Distress, Alert and Oriented, Dressing is Dry,  Neurovascular exam is normal.     Assessment / Plan :  Patient Active Problem List   Diagnosis Code    Unstable angina ([facility] I20.0    Dyslipidemia E78.5    Obesity E66.9    Hypertension I10    Tobacco abuse Z72.0    Coronary atherosclerosis of native coronary artery I25.10    Accelerated hypertension I10    Other chest pain R07.89    Palpitations R00.2    Preop cardiovascular exam Z01.810    Knee arthropathy M17.10    Patient Vitals for the past 8 hrs:   BP Temp Pulse Resp SpO2   18 0530 112/73 98.8 °F (37.1 °C) 61 18 90 %    Temp (24hrs), Av °F (36.7 °C), Min:96.9 °F (36.1 °C), Max:99.3 °F (37.4 °C)    Body mass index is 38.07 kg/(m^2).     Lab Results   Component Value Date/Time    HGB 10.7 2018 04:55 AM      Pt seen by and discussed with [address] [place] [address] for home today if therapy goes well       Signed By: JESSICA Hernandez

## 2018-01-13 NOTE — DISCHARGE INSTRUCTIONS
1) KEEP YOUR APPOINTMENT WITH DR.WILLIAM MANCERA,,,IF NO APPOINTMENT MADE,,CALL THE OFFICE AT # [phone] TO GET ONE. .... 2) [facility] HAS BEEN ASSIGNED TO MAKE HOME VISITS FOR PATIENT PHYSICAL THERAPY,,,FOR WOUND CHECKS,,,DRESSING CHANGES. Chastity Rm .. CONTACT NUMBER # [phone]. .................. Chastity Rm Total Knee Replacement: What to Expect at [address] [place] [address]    When you leave the hospital, you should be able to move around with a walker or crutches. But you will need someone to help you at home for the next few weeks or until you have more energy and can move around better. If there is no one to help you at home, you may go to a rehabilitation center. You will go home with a bandage and stitches or staples. Change the bandage as your doctor tells you to. Your doctor will remove your stitches or staples 10 to 21 days after your surgery. You may still have some mild pain, and the area may be swollen for 3 to 6 months after surgery. Your knee will continue to improve for 6 to 12 months. You will probably use a walker for 1 to 3 weeks and then use crutches. When you are ready, you can use a cane. You will probably be able to walk on your own in 4 to 8 weeks. You will need to do months of physical rehabilitation (rehab) after a knee replacement. Rehab will help you strengthen the muscles of the knee and help you regain movement. After you recover, your artificial knee will allow you to do normal daily activities with less pain or no pain at all. You may be able to hike, dance, ride a bike, and play golf. Talk to your doctor about whether you can do more strenuous activities. Always tell your caregivers that you have an artificial knee. How long it will take to walk on your own, return to normal activities, and go back to work depends on your health and how well your rehabilitation (rehab) program goes.  The better you do with your rehab exercises, the quicker you will get your strength and movement back. This care sheet gives you a general idea about how long it will take for you to recover. But each person recovers at a different pace. Follow the steps below to get better as quickly as possible. How can you care for yourself at home? Activity  ? · Rest when you feel tired. You may take a nap, but do not stay in bed all day. When you sit, use a chair with arms. You can use the arms to help you stand up. ? · Work with your physical therapist to find the best way to exercise. You may be able to take frequent, short walks using crutches or a walker. What you can do as your knee heals will depend on whether your new knee is cemented or uncemented. You may not be able to do certain things for a while if your new knee is uncemented. ? · After your knee has healed enough, you can do more strenuous activities with caution. ¨ You can golf, but use a golf cart, and do not wear shoes with spikes. ¨ You can bike on a flat road or on a stationary bike. Avoid biking up hills. ¨ Your doctor may suggest that you stay away from activities that put stress on your knee. These include tennis or badminton, squash or racquetball, contact sports like football, jumping (such as in basketball), jogging, or running. ¨ Avoid activities where you might fall. These include horseback riding, skiing, and mountain biking. ? · Do not sit for more than 1 hour at a time. Get up and walk around for a while before you sit again. If you must sit for a long time, prop up your leg with a chair or footstool. This will help you avoid swelling. ? · Ask your doctor when you can shower. You may need to take sponge baths until your stitches or staples have been removed. ? · Ask your doctor when you can drive again. It may take up to 8 weeks after knee replacement surgery before it is safe for you to drive.    ? · When you get into a car, sit on the edge of the seat. Then pull in your legs, and turn to face the front. ? · You should be able to do many everyday activities 3 to 6 weeks after your surgery. You will probably need to take 4 to 16 weeks off from work. When you can go back to work depends on the type of work you do and how you feel. ? · Ask your doctor when it is okay for you to have sex. ? · Do not lift anything heavier than 10 pounds and do not lift weights for 12 weeks. Diet  ? · By the time you leave the hospital, you should be eating your normal diet. If your stomach is upset, try bland, low-fat foods like plain rice, broiled chicken, toast, and yogurt. Your doctor may suggest that you take iron and vitamin supplements. ? · Drink plenty of fluids (unless your doctor tells you not to). ? · Eat healthy foods, and watch your portion sizes. Try to stay at your ideal weight. Too much weight puts more stress on your new knee. ? · You may notice that your bowel movements are not regular right after your surgery. This is common. Try to avoid constipation and straining with bowel movements. You may want to take a fiber supplement every day. If you have not had a bowel movement after a couple of days, ask your doctor about taking a mild laxative. Medicines  ? · Your doctor will tell you if and when you can restart your medicines. He or she will also give you instructions about taking any new medicines. ? · If you take blood thinners, such as warfarin (Coumadin), clopidogrel (Plavix), or aspirin, be sure to talk to your doctor. He or she will tell you if and when to start taking those medicines again. Make sure that you understand exactly what your doctor wants you to do.   ? · Your doctor may give you a blood-thinning medicine to prevent blood clots. If you take a blood thinner, be sure you get instructions about how to take your medicine safely. Blood thinners can cause serious bleeding problems.  This medicine could be in pill form or as a shot (injection). If a shot is necessary, your doctor will tell you how to do this. ? · Be safe with medicines. Take pain medicines exactly as directed. ¨ If the doctor gave you a prescription medicine for pain, take it as prescribed. ¨ If you are not taking a prescription pain medicine, ask your doctor if you can take an over-the-counter medicine. ¨ Plan to take your pain medicine 30 minutes before exercises. It is easier to prevent pain before it starts than to stop it once it has started. ? · If you think your pain medicine is making you sick to your stomach:  ¨ Take your medicine after meals (unless your doctor has told you not to). ¨ Ask your doctor for a different pain medicine. ? · If your doctor prescribed antibiotics, take them as directed. Do not stop taking them just because you feel better. You need to take the full course of antibiotics. Incision care  ? · You will have a bandage over the cut (incision) and staples or stitches. Take the bandage off when your doctor says it is okay. ? · Your doctor will remove the staples or stitches 10 days to 3 weeks after the surgery and replace them with strips of tape. Leave the tape on for a week or until it falls off. Exercise  ? · Your rehab program will give you a number of exercises to do to help you get back your knee's range of motion and strength. Always do them as your therapist tells you. Ice and elevation  ? · For pain and swelling, put ice or a cold pack on the area for 10 to 20 minutes at a time. Put a thin cloth between the ice and your skin. Other instructions  ? · Continue to wear your support stockings as your doctor says. These help to prevent blood clots. The length of time that you will have to wear them depends on your activity level and the amount of swelling. ? · You have metal pieces in your knee. These may set off some airport metal detectors.  Carry a medical alert card that says you have an artificial joint, just in case.   Follow-up care is a key part of your treatment and safety. Be sure to make and go to all appointments, and call your doctor if you are having problems. It's also a good idea to know your test results and keep a list of the medicines you take. When should you call for help? Call 911 anytime you think you may need emergency care. For example, call if:  ? · You passed out (lost consciousness). ? · You have severe trouble breathing. ? · You have sudden chest pain and shortness of breath, or you cough up blood. ?Call your doctor now or seek immediate medical care if:  ? · You have signs of infection, such as:  ¨ Increased pain, swelling, warmth, or redness. ¨ Red streaks leading from the incision. ¨ Pus draining from the incision. ¨ A fever. ? · You have signs of a blood clot, such as:  ¨ Pain in your calf, back of the knee, thigh, or groin. ¨ Redness and swelling in your leg or groin. ? · Your incision comes open and begins to bleed, or the bleeding increases. ? · You have pain that does not get better after you take pain medicine. ? Watch closely for changes in your health, and be sure to contact your doctor if:  ? · You do not have a bowel movement after taking a laxative. Where can you learn more? Go to http://harley-pedro.info/. Enter J066 in the search box to learn more about \"Total Knee Replacement: What to Expect at Home. \"  Current as of: March 21, 2017  Content Version: 11.4  © 8328-5894 [employer]. Care instructions adapted under license by [employer] (which disclaims liability or warranty for this information). If you have questions about a medical condition or this instruction, always ask your healthcare professional. [address] any warranty or liability for your use of this information.

## 2018-01-13 NOTE — PROGRESS NOTES
Given one tablet Percocet,prn medication for pain level=3-4/10. Gustavo Navarro sitting up on the recliner chair at this time,,waiting for gym group therapy to get started,,expressed desire to go home today. ...

## 2018-01-13 NOTE — PROGRESS NOTES
In bed resting quietly,,assessment completed,,dorsiflexing both feet well,,Aqua ada dressing on left knee intact,clean and dry,,IV access intact and patent on left  Hand,,given all p.o.medications and taken well,,no distress noted. Hermilo Sneed

## 2018-01-13 NOTE — PROGRESS NOTES
[facility]  Face to Face Encounter    Patients Name: Jg Sutton    YOB: 1960    Ordering Physician: Marquis Sheehan    Primary Diagnosis: Patty Garcia    Date of Face to Face:   1/12/2018       Face to Face Encounter findings are related to primary reason for home care:   yes. 1. I certify that the patient needs intermittent care as follows: physical therapy: strengthening    2. I certify that this patient is homebound, that is: 1) patient requires the use of a walker device, special transportation, or assistance of another to leave the home; or 2) patient's condition makes leaving the home medically contraindicated; and 3) patient has a normal inability to leave the home and leaving the home requires considerable and taxing effort. Patient may leave the home for infrequent and short duration for medical reasons, and occasional absences for non-medical reasons. Homebound status is due to the following functional limitations: Patient with strength deficits limiting the performance of all ADL's without caregiver assistance or the use of an assistive device. 3. I certify that this patient is under my care and that I, or a nurse practitioner or  [id number], or clinical nurse specialist, or certified nurse midwife, working with me, had a Face-to-Face Encounter that meets the physician Face-to-Face Encounter requirements. The following are the clinical findings from the [address] [place] [address] encounter that support the need for skilled services and is a summary of the encounter: See hospital chart. See attached progess note      Kaley Lopez LMSW  5/31/2017      THE FOLLOWING TO BE COMPLETED BY THE COMMUNITY PHYSICIAN:    I concur with the findings described above from the [facility] encounter that this patient is homebound and in need of a skilled service.     Certifying Physician: _____________________________________      Printed Certifying Physician Name: _____________________________________    Date: _________________

## 2018-01-13 NOTE — PROGRESS NOTES
Problem: Mobility Impaired (Adult and Pediatric)  Goal: *Acute Goals and Plan of Care (Insert Text)  GOALS (1-4 days):  (1.)Ms. Chani Huerta will move from supine to sit and sit to supine  in bed with SUPERVISION. (2.)Ms. Chani Huerta will transfer from bed to chair and chair to bed with STAND BY ASSIST using the least restrictive device. (3.)Ms. Chani Huetra will ambulate with STAND BY ASSIST for 200 feet with the least restrictive device. (4.)Ms. Chani Huerta will ambulate up/down 3 steps with right railing with CONTACT GUARD ASSIST with no device. (5.)Ms. Chani Huerta will increase left knee ROM to 5°-80°.  ________________________________________________________________________________________________    PHYSICAL THERAPY Joint camp tKa: Daily Note, Treatment Day: 1st, AM 1/13/2018  OBSERVATION: Hospital Day: 2  Payor: Shira Maya / Plan: Chauncey Cuadra / Product Type: PPO /      NAME/AGE/GENDER: Raissa Wolfe is a 59 y.o. female   PRIMARY DIAGNOSIS:  Acute pain of left knee [M25.562]  Unilateral primary osteoarthritis, left knee [M17.12]   Procedure(s) and Anesthesia Type:     * LEFT KNEE ARTHROPLASTY TOTAL / LEFT - Spinal (Left)  ICD-10: Treatment Diagnosis:    · Pain in Left Knee (M25.562)  · Stiffness of Left Knee, Not elsewhere classified (M25.662)  · Difficulty in walking, Not elsewhere classified (R26.2)      ASSESSMENT:     Ms. Chani Huerta showed increased gait distance & improved level of assist for bed mobility, transfers & gait in am session. This section established at most recent assessment   PROBLEM LIST (Impairments causing functional limitations):  1. Decreased Strength  2. Decreased ADL/Functional Activities  3. Decreased Transfer Abilities  4. Decreased Ambulation Ability/Technique  5. Decreased Balance  6. Increased Pain  7. Decreased Activity Tolerance  8. Decreased Flexibility/Joint Mobility  9.  Decreased [place] with Home Exercise Program   INTERVENTIONS PLANNED: (Benefits and precautions of physical therapy have been discussed with the patient.)  1. Bed Mobility  2. Gait Training  3. Home Exercise Program (HEP)  4. Therapeutic Exercise/Strengthening  5. Transfer Training  6. Range of Motion: active/assisted/passive  7. Therapeutic Activities  8. Group Therapy     TREATMENT PLAN: Frequency/Duration: Follow patient BID   to address above goals. Rehabilitation Potential For Stated Goals: Good     RECOMMENDED REHABILITATION/EQUIPMENT: (at time of discharge pending progress): Continue Skilled Therapy and Home Health: Physical Therapy. HISTORY:   History of Present Injury/Illness (Reason for Referral):  Left TKA  Past Medical History/Comorbidities:   Ms. Ray Baird  has a past medical history of Arthritis; CAD (coronary artery disease); Former cigarette smoker; GERD (gastroesophageal reflux disease); Hypertension; Morbid obesity ([address] Nicotine vapor product user; Other chest pain (2017); Palpitations (2017); Psychiatric disorder; PUD (peptic ulcer disease); Sleep apnea; and Thyroid disease. She also has no past medical history of Adverse effect of anesthesia; Aneurysm ([address] Arrhythmia; Asthma; Autoimmune disease ([address] Cancer ([address] Chronic kidney disease; Chronic obstructive pulmonary disease ([address] Chronic pain; Coagulation defects; Coagulation disorder ([address] COPD; Diabetes ([address] Difficult intubation; Endocarditis; Heart failure ([address] Ill-defined condition; Liver disease; Malignant hyperthermia due to anesthesia; Nausea & vomiting; Non-nicotine vapor product user; Other ill-defined conditions(799.89); Pseudocholinesterase deficiency; Rheumatic fever; Seizures ([address] Stroke [facility]); or Thromboembolus ([address] Ms. Ray Baird  has a past surgical history that includes hx hysterectomy; hx orthopaedic; hx orthopaedic; hx orthopaedic; hx heent; hx cataract removal; hx coronary stent placement (2012); hx heart catheterization (2013); and hx  section (, ).   Social History/Living Environment:   Home Environment: Private residence  # Steps to Enter: 3  Rails to Enter: Yes  Hand Rails : Right  One/Two Story Residence: One story  Living Alone: No  Support Systems: Spouse/Significant Other/Partner  Patient Expects to be Discharged to[de-identified] Private residence  Current DME Used/Available at Home: Walker, rolling  Prior Level of Function/Work/Activity:  Pt was independent without an assistive device   Number of Personal Factors/Comorbidities that affect the Plan of Care: 3+: HIGH COMPLEXITY   EXAMINATION:   Most Recent Physical Functioning:                  LLE PROM  L Knee Flexion: 50 (~)  L Knee Extension: -20 (~)         Bed Mobility  Supine to Sit: Stand-by asssistance  Sit to Supine: Stand-by asssistance  Scooting: Stand-by asssistance    Transfers  Sit to Stand: Contact guard assistance  Stand to Sit: Contact guard assistance  Bed to Chair: Contact guard assistance (with walker)    Balance  Sitting: Intact; Without support  Standing: Impaired; With support (walker)              Weight Bearing Status  Left Side Weight Bearing: As tolerated  Distance (ft): 100 Feet (ft)  Ambulation - Level of Assistance: Contact guard assistance  Assistive Device: Walker, rolling  Speed/Tiara: Delayed  Step Length: Right shortened  Stance: Left decreased  Gait Abnormalities: Antalgic;Decreased step clearance        Braces/Orthotics: none    Left Knee Cold  Type: Cryocuff      Body Structures Involved:  1. Joints  2. Muscles Body Functions Affected:  1. Sensory/Pain  2. Movement Related Activities and Participation Affected:  1. General Tasks and Demands  2. Mobility   Number of elements that affect the Plan of Care: 4+: HIGH COMPLEXITY   CLINICAL PRESENTATION:   Presentation: Stable and uncomplicated: LOW COMPLEXITY   CLINICAL DECISION MAKIN [facility] [address] [address] AM-PAC 6 Clicks   Basic Mobility Inpatient Short Form  How much difficulty does the patient currently have. .. Unable A Lot A Little None   1.   Turning over in bed (including adjusting bedclothes, sheets and blankets)? [] 1   [] 2   [x] 3   [] 4   2. Sitting down on and standing up from a chair with arms ( e.g., wheelchair, bedside commode, etc.)   [] 1   [] 2   [x] 3   [] 4   3. Moving from lying on back to sitting on the side of the bed? [] 1   [] 2   [x] 3   [] 4   How much help from another person does the patient currently need. .. Total A Lot A Little None   4. Moving to and from a bed to a chair (including a wheelchair)? [] 1   [] 2   [x] 3   [] 4   5. Need to walk in hospital room? [] 1   [] 2   [x] 3   [] 4   6. Climbing 3-5 steps with a railing? [x] 1   [] 2   [] 3   [] 4   © 2007, Trustees of [address] [place] [address], under license to [employer]. All rights reserved        Score:  Initial: 16 Most Recent: X (Date: -- )    Interpretation of Tool:  Represents activities that are increasingly more difficult (i.e. Bed mobility, Transfers, Gait). Score 24 23 22-20 19-15 14-10 9-7 6     Modifier CH CI CJ CK CL CM CN      ? Mobility - Walking and Moving Around:     - CURRENT STATUS: CK - 40%-59% impaired, limited or restricted    - GOAL STATUS: CJ - 20%-39% impaired, limited or restricted    - D/C STATUS:  ---------------To be determined---------------  Payor: JOSE ALEJANDRO / Plan: Delores Griffiths / Product Type: PPO /      Medical Necessity:     · Patient is expected to demonstrate progress in strength, range of motion, balance, coordination and functional technique to decrease assistance required with bed mobility, transfers & gait. Reason for Services/Other Comments:  · Patient continues to require skilled intervention due to pt not independent with functional mobility.    Use of outcome tool(s) and clinical judgement create a POC that gives a: Clear prediction of patient's progress: LOW COMPLEXITY            TREATMENT:   (In addition to Assessment/Re-Assessment sessions the following treatments were rendered)     Pre-treatment Symptoms/Complaints:  Pt pleased with progress  Pain: Initial: visual scale  Pain Intensity 1: 3  Pain Location 1: Knee  Pain Orientation 1: Left  Pain Intervention(s) 1: Cold pack, Exercise  Post Session:  3/10     Gait Training (15 Minutes):  Gait training to improve and/or restore physical functioning as related to mobility, strength, balance, coordination and dynamic movement of leg - left to improve functional gait. Ambulated 100 Feet (ft) with Contact guard assistance using a Walker, rolling and minimal cues  related to their stride length and heel strike to promote proper body alignment, promote proper body posture and promote proper body mechanics. Therapeutic Exercise: (15 Minutes):  Exercises per grid below to improve mobility and dynamic movement of leg - left to improve functional endurance. Required minimal verbal cues to promote proper body alignment and promote proper body mechanics. Progressed range and repetitions as indicated. Date:  1/13 Date:   Date:     ACTIVITY/EXERCISE AM PM AM PM AM PM   GROUP THERAPY  []  []  []  []  []  []   Ankle Pumps 15        Quad Sets 15        Gluteal Sets 15        Hip ABd/ADduction 15        Straight Leg Raises 15aa        Knee Slides 15        Short Arc Quads 15        Long Arc Quads         Chair Slides                  B = bilateral; AA = active assistive; A = active; P = passive      Treatment/Session Assessment:     Response to Treatment:  Tolerated well    Education:  [x] Home Exercises  [x] Fall Precautions  [] Hip Precautions [] D/C Instruction Review  [] Knee/Hip Prosthesis Review  [x] Walker Management/Safety [] Adaptive Equipment as Needed       Interdisciplinary Collaboration:   o Registered Nurse    After treatment position/precautions:   o Up in chair  o Bed/Chair-wheels locked  o Call light within reach  o RN notified    Compliance with Program/Exercises: Will assess as treatment progresses.     Recommendations/Intent for next treatment session:  Treatment next visit will focus on increasing Ms. Sawyer's independence with bed mobility, transfers, gait training, strength/ROM exercises, modalities for pain, and patient education.       Total Treatment Duration:  PT Patient Time In/Time Out  Time In: 0645  Time Out: 61 [facility],

## 2018-01-13 NOTE — PROGRESS NOTES
Problem: Mobility Impaired (Adult and Pediatric)  Goal: *Acute Goals and Plan of Care (Insert Text)  GOALS (1-4 days):  (1.)Ms. Parish Jacinto will move from supine to sit and sit to supine  in bed with SUPERVISION. (2.)Ms. Parish Jacinto will transfer from bed to chair and chair to bed with STAND BY ASSIST using the least restrictive device. Met 1/13  (3.)Ms. Parish Jacinto will ambulate with STAND BY ASSIST for 200 feet with the least restrictive device. Met 1/13  (4.)Ms. Parish Jacinto will ambulate up/down 3 steps with right railing with CONTACT GUARD ASSIST with no device. Met 1/13  (5.)Ms. Parish Jacinto will increase left knee ROM to 5°-80°.  ________________________________________________________________________________________________    PHYSICAL THERAPY Joint camp tKa: Daily Note, Discharge, Treatment Day: 1st, PM 1/13/2018  OBSERVATION: Hospital Day: 2  Payor: Olivia Burr / Plan: [address] [place] [address] / Product Type: PPO /      NAME/AGE/GENDER: Rm Hernandez is a 59 y.o. female   PRIMARY DIAGNOSIS:  Acute pain of left knee [M25.562]  Unilateral primary osteoarthritis, left knee [M17.12]   Procedure(s) and Anesthesia Type:     * LEFT KNEE ARTHROPLASTY TOTAL / LEFT - Spinal (Left)  ICD-10: Treatment Diagnosis:    · Pain in Left Knee (M25.562)  · Stiffness of Left Knee, Not elsewhere classified (M25.662)  · Difficulty in walking, Not elsewhere classified (R26.2)      ASSESSMENT:     Ms. Parish Jacinto showed increased gait distance & improved level of assist for bed mobility, transfers & gait in am session. In pm session pt continued to show steady gains with functional mobility & with tolerance to exercises. This section established at most recent assessment   PROBLEM LIST (Impairments causing functional limitations):  1. Decreased Strength  2. Decreased ADL/Functional Activities  3. Decreased Transfer Abilities  4. Decreased Ambulation Ability/Technique  5. Decreased Balance  6. Increased Pain  7. Decreased Activity Tolerance  8.  Decreased Flexibility/Joint Mobility  9. Decreased [place] with Home Exercise Program   INTERVENTIONS PLANNED: (Benefits and precautions of physical therapy have been discussed with the patient.)  1. Bed Mobility  2. Gait Training  3. Home Exercise Program (HEP)  4. Therapeutic Exercise/Strengthening  5. Transfer Training  6. Range of Motion: active/assisted/passive  7. Therapeutic Activities  8. Group Therapy     TREATMENT PLAN: Frequency/Duration: Follow patient BID   to address above goals. Rehabilitation Potential For Stated Goals: Good     RECOMMENDED REHABILITATION/EQUIPMENT: (at time of discharge pending progress): Continue Skilled Therapy and Home Health: Physical Therapy. HISTORY:   History of Present Injury/Illness (Reason for Referral):  Left TKA  Past Medical History/Comorbidities:   Ms. Mela Dailey  has a past medical history of Arthritis; CAD (coronary artery disease); Former cigarette smoker; GERD (gastroesophageal reflux disease); Hypertension; Morbid obesity ([address] Nicotine vapor product user; Other chest pain (11/16/2017); Palpitations (11/16/2017); Psychiatric disorder; PUD (peptic ulcer disease); Sleep apnea; and Thyroid disease. She also has no past medical history of Adverse effect of anesthesia; Aneurysm ([address] Arrhythmia; Asthma; Autoimmune disease ([address] Cancer ([address] Chronic kidney disease; Chronic obstructive pulmonary disease ([address] Chronic pain; Coagulation defects; Coagulation disorder ([address] COPD; Diabetes ([address] Difficult intubation; Endocarditis; Heart failure ([address] Ill-defined condition; Liver disease; Malignant hyperthermia due to anesthesia; Nausea & vomiting; Non-nicotine vapor product user; Other ill-defined conditions(799.89); Pseudocholinesterase deficiency; Rheumatic fever; Seizures ([address] Stroke [facility]); or Thromboembolus ([address]   Ms. Mela Dailey  has a past surgical history that includes hx hysterectomy; hx orthopaedic; hx orthopaedic; hx orthopaedic; hx heent; hx cataract removal; hx coronary stent placement (2012); hx heart catheterization (2013); and hx  section (, ). Social History/Living Environment:   Home Environment: Private residence  # Steps to Enter: 3  Rails to Enter: Yes  Hand Rails : Right  One/Two Story Residence: One story  Living Alone: No  Support Systems: Spouse/Significant Other/Partner  Patient Expects to be Discharged to[de-identified] Private residence  Current DME Used/Available at Home: Walker, rolling  Prior Level of Function/Work/Activity:  Pt was independent without an assistive device   Number of Personal Factors/Comorbidities that affect the Plan of Care: 3+: HIGH COMPLEXITY   EXAMINATION:   Most Recent Physical Functioning:                  LLE PROM  L Knee Flexion: 75  L Knee Extension: -9         Bed Mobility  Supine to Sit:  (NT)  Sit to Supine:  (NT)  Scooting: Supervision    Transfers  Sit to Stand: Stand-by asssistance  Stand to Sit: Stand-by asssistance  Bed to Chair: Stand-by asssistance (with walker)    Balance  Sitting: Intact; Without support  Standing: Impaired; With support (walker)              Weight Bearing Status  Left Side Weight Bearing: As tolerated  Distance (ft): 234 Feet (ft) (x 2)  Ambulation - Level of Assistance: Stand-by asssistance  Assistive Device: Walker, rolling  Speed/Tiara: Delayed  Step Length: Right shortened  Stance: Left decreased  Gait Abnormalities: Antalgic;Decreased step clearance  Number of Stairs Trained: 3  Stairs - Level of Assistance: Contact guard assistance  Rail Use: Both     Braces/Orthotics: none    Left Knee Cold  Type: Cryocuff      Body Structures Involved:  1. Joints  2. Muscles Body Functions Affected:  1. Sensory/Pain  2. Movement Related Activities and Participation Affected:  1. General Tasks and Demands  2.  Mobility   Number of elements that affect the Plan of Care: 4+: HIGH COMPLEXITY   CLINICAL PRESENTATION:   Presentation: Stable and uncomplicated: LOW COMPLEXITY   CLINICAL DECISION MAKIN[address] [place] [address] AM-PAC 6 Clicks   Basic Mobility Inpatient Short Form  How much difficulty does the patient currently have. .. Unable A Lot A Little None   1. Turning over in bed (including adjusting bedclothes, sheets and blankets)? [] 1   [] 2   [x] 3   [] 4   2. Sitting down on and standing up from a chair with arms ( e.g., wheelchair, bedside commode, etc.)   [] 1   [] 2   [x] 3   [] 4   3. Moving from lying on back to sitting on the side of the bed? [] 1   [] 2   [x] 3   [] 4   How much help from another person does the patient currently need. .. Total A Lot A Little None   4. Moving to and from a bed to a chair (including a wheelchair)? [] 1   [] 2   [x] 3   [] 4   5. Need to walk in hospital room? [] 1   [] 2   [x] 3   [] 4   6. Climbing 3-5 steps with a railing? [x] 1   [] 2   [] 3   [] 4   © 2007, Trustees of [address] [place], [place] [id number], under license to [employer]. All rights reserved        Score:  Initial: 16 Most Recent: X (Date: -- )    Interpretation of Tool:  Represents activities that are increasingly more difficult (i.e. Bed mobility, Transfers, Gait). Score 24 23 22-20 19-15 14-10 9-7 6     Modifier CH CI CJ CK CL CM CN      ? Mobility - Walking and Moving Around:     - CURRENT STATUS: CK - 40%-59% impaired, limited or restricted    - GOAL STATUS: CJ - 20%-39% impaired, limited or restricted    - D/C STATUS:  CK - 40%-59% impaired, limited or restricted  Payor: Carlos Atwoods / Plan: Yolonda Schilder / Product Type: PPO /      Medical Necessity:     · Patient is expected to demonstrate progress in strength, range of motion, balance, coordination and functional technique to decrease assistance required with bed mobility, transfers & gait. Reason for Services/Other Comments:  · Patient continues to require skilled intervention due to pt not independent with functional mobility.    Use of outcome tool(s) and clinical judgement create a POC that gives a: Clear prediction of patient's progress: LOW COMPLEXITY            TREATMENT:   (In addition to Assessment/Re-Assessment sessions the following treatments were rendered)     Pre-treatment Symptoms/Complaints:  none  Pain: Initial: visual scale  Pain Intensity 1: 3  Pain Location 1: Knee  Pain Orientation 1: Left  Pain Intervention(s) 1: Ambulation/Increased Activity, Cold pack  Post Session:  3/10     Gait Training (15 Minutes):  Gait training to improve and/or restore physical functioning as related to mobility, strength, balance, coordination and dynamic movement of leg - left to improve functional gait. Ambulated 234 Feet (ft) (x 2) with Stand-by asssistance using a Walker, rolling and minimal cues  related to their stride length and heel strike to promote proper body alignment, promote proper body posture and promote proper body mechanics. Therapeutic Exercise: (45 Minutes (group)):  Exercises per grid below to improve mobility and dynamic movement of leg - left to improve functional endurance. Required minimal verbal cues to promote proper body alignment and promote proper body mechanics. Progressed range and repetitions as indicated.           Date:  1/13 Date:   Date:     ACTIVITY/EXERCISE AM PM AM PM AM PM   GROUP THERAPY  []  [x]  []  []  []  []   Ankle Pumps 15 15       Quad Sets 15 15       Gluteal Sets 15 15       Hip ABd/ADduction 15 15       Straight Leg Raises 15aa 15       Knee Slides 15 15       Short Arc Quads 15 15       Long Arc Quads         Chair Slides  15                B = bilateral; AA = active assistive; A = active; P = passive      Treatment/Session Assessment:     Response to Treatment:  Tolerated well    Education:  [x] Home Exercises  [x] Fall Precautions  [x] Hip Precautions [x] D/C Instruction Review  [x] Knee/Hip Prosthesis Review  [x] Walker Management/Safety [] Adaptive Equipment as Needed       Interdisciplinary Collaboration:   o Registered Nurse    After treatment position/precautions:   o Up in chair  o Bed/Chair-wheels locked  o Call light within reach  o RN notified    Compliance with Program/Exercises: Will assess as treatment progresses. Recommendations/Intent for next treatment session:  Treatment next visit will focus on increasing Ms. Rolando's independence with bed mobility, transfers, gait training, strength/ROM exercises, modalities for pain, and patient education.       Total Treatment Duration:  PT Patient Time In/Time Out  Time In: 1300  Time Out: [address], PT

## 2018-01-13 NOTE — PROGRESS NOTES
Problem: Self Care Deficits Care Plan (Adult)  Goal: *Acute Goals and Plan of Care (Insert Text)  GOALS: GOAL MET 1/13/2018  DISCHARGE GOALS (in preparation for going home/rehab):  3 days  1. Ms. Chani Huerta will perform one lower body dressing activity with minimal assistance required to demonstrate improved functional mobility and safety. 2.  Ms. Chani Huerta will perform one lower body bathing activity with minimal assistance required to demonstrate improved functional mobility and safety. 3.  Ms. Chani Huerta will perform toileting/toilet transfer with contact guard assistance to demonstrate improved functional mobility and safety. 4.  Ms. Chani Huerta will perform shower transfer with contact guard assistance to demonstrate improved functional mobility and safety. JOINT CAMP OCCUPATIONAL THERAPY TKA: Daily Note, Discharge and Treatment Day: 1st 1/13/2018  OBSERVATION: Hospital Day: 2  Payor: Shira Maya / Plan: Chauncey Foil / Product Type: PPO /      NAME/AGE/GENDER: Raissa Wolfe is a 59 y.o. female   PRIMARY DIAGNOSIS:  Acute pain of left knee [M25.562]  Unilateral primary osteoarthritis, left knee [M17.12]   Procedure(s) and Anesthesia Type:     * LEFT KNEE ARTHROPLASTY TOTAL / LEFT - Spinal (Left)  ICD-10: Treatment Diagnosis:    · Pain in Left Knee (M25.562)  · Stiffness of Left Knee, Not elsewhere classified (R30.955)      ASSESSMENT:      Ms. Chani Huerta is s/p left TKA and presents with decreased weight bearing on left LE and decreased independence with functional mobility and activities of daily living. Patient completed shower and dressing as charter below in ADL grid and is ambulating with rolling walker and stand by assist.  Patient has met 4/4 goals and plans to return home with good family support. Family able to provide patient with appropriate level of assistance at this time. OT reviewed safety precautions throughout session and therapy schedule for the remainder of today.   Patient instructed to call for assistance when needing to get up from recliner and all needs in reach. Patient verbalized understanding of call light. This section established at most recent assessment   PROBLEM LIST (Impairments causing functional limitations):  1. Decreased Strength  2. Decreased ADL/Functional Activities  3. Decreased Transfer Abilities  4. Increased Pain  5. Increased Fatigue  6. Decreased Flexibility/Joint Mobility  7. Decreased Knowledge of Precautions   INTERVENTIONS PLANNED: (Benefits and precautions of occupational therapy have been discussed with the patient.)  1. Activities of daily living training  2. Adaptive equipment training  3. Balance training  4. Clothing management  5. Donning&doffing training  6. Theraputic activity     TREATMENT PLAN: Frequency/Duration: Follow patient 1-2 times to address above goals. Rehabilitation Potential For Stated Goals: Good     RECOMMENDED REHABILITATION/EQUIPMENT: (at time of discharge pending progress): Continue Skilled Therapy and Home Health: Physical Therapy. OCCUPATIONAL PROFILE AND HISTORY:   History of Present Injury/Illness (Reason for Referral): Pt presents this date s/p (left) TKA. Past Medical History/Comorbidities:   Ms. Kay Garcia  has a past medical history of Arthritis; CAD (coronary artery disease); Former cigarette smoker; GERD (gastroesophageal reflux disease); Hypertension; Morbid obesity ([address] Nicotine vapor product user; Other chest pain (11/16/2017); Palpitations (11/16/2017); Psychiatric disorder; PUD (peptic ulcer disease); Sleep apnea; and Thyroid disease. She also has no past medical history of Adverse effect of anesthesia; Aneurysm ([address] Arrhythmia; Asthma; Autoimmune disease ([address] Cancer ([address] Chronic kidney disease; Chronic obstructive pulmonary disease ([address] Chronic pain; Coagulation defects; Coagulation disorder ([address] COPD; Diabetes ([address] Difficult intubation; Endocarditis; Heart failure ([address]  Ill-defined condition; Liver disease; Malignant hyperthermia due to anesthesia; Nausea & vomiting; Non-nicotine vapor product user; Other ill-defined conditions(799.89); Pseudocholinesterase deficiency; Rheumatic fever; Seizures ([facility] Stroke [facility]); or Thromboembolus ([facility] [address] Ms. Romayne Andrew  has a past surgical history that includes hx hysterectomy; hx orthopaedic; hx orthopaedic; hx orthopaedic; hx heent; hx cataract removal; hx coronary stent placement (2012); hx heart catheterization (2013); and hx  section (, ). Social History/Living Environment:   Home Environment: Private residence  # Steps to Enter: 3  Rails to Enter: Yes  Hand Rails : Right  One/Two Story Residence: One story  Living Alone: No  Support Systems: Spouse/Significant Other/Partner  Patient Expects to be Discharged to[de-identified] Private residence  Current DME Used/Available at Home: Walker, rolling  Prior Level of Function/Work/Activity:  Independent      Number of Personal Factors/Comorbidities that affect the Plan of Care: Brief history (0):  LOW COMPLEXITY   ASSESSMENT OF OCCUPATIONAL PERFORMANCE[de-identified]   Most Recent Physical Functioning:   Balance  Sitting: Intact  Standing: With support; Intact       Patient Vitals for the past 6 hrs:   BP BP Patient Position SpO2 Pulse   18 0530 112/73 At rest 90 % 61   18 0703 112/72 At rest 90 % 60                  LLE Assessment  LLE Assessment (WDL): Exception to WDL  LLE PROM  L Knee Flexion: 50 (~)  L Knee Extension: -20 (~)           Mental Status  Neurologic State: Alert          RLE Assessment  RLE Assessment (WDL): Within defined limits     Basic ADLs (From Assessment) Complex ADLs (From Assessment)   Basic ADL  Feeding: Independent  Oral Facial Hygiene/Grooming: Supervision  Bathing: Moderate assistance  Upper Body Dressing: Supervision  Lower Body Dressing: Moderate assistance  Toileting:  Moderate assistance     Grooming/Bathing/Dressing Activities of Daily Living   Grooming  Grooming Assistance: Supervision/set up (standing at sink)     Upper Body Bathing  Bathing Assistance: Supervision/set-up (seated on shower chair)     Lower Body Bathing  Bathing Assistance: Supervision/set-up (seated on shower chair) Toileting  Toileting Assistance: Supervision/set up  Adaptive Equipment: Walker;Elevated seat;Grab bars   Upper Body Dressing Assistance  Dressing Assistance: Supervision/set-up Functional Transfers  Bathroom Mobility: Supervision/set up  Toilet Transfer : Supervision  Shower Transfer: Supervision  Cues: Verbal cues provided  Adaptive Equipment: Shower chair with back;Grab bars; Walker (comment)   Lower Body Dressing Assistance  Dressing Assistance: Minimum assistance (pants only) Bed/Mat Mobility  Supine to Sit: Stand-by asssistance  Sit to Supine: Stand-by asssistance  Sit to Stand: Contact guard assistance  Bed to Chair: Contact guard assistance (with walker)  Scooting: Stand-by asssistance         Physical Skills Involved:  1. Range of Motion  2. Balance  3. Strength Cognitive Skills Affected (resulting in the inability to perform in a timely and safe manner): 1. none Psychosocial Skills Affected:  1. Environmental Adaptation   Number of elements that affect the Plan of Care: 3-5:  MODERATE COMPLEXITY   CLINICAL DECISION MAKING:   Ok CLAYPAC 6 Clicks   Daily Activity Inpatient Short Form  How much help from another person does the patient currently need. .. Total A Lot A Little None   1. Putting on and taking off regular lower body clothing? [] 1   [x] 2   [] 3   [] 4   2. Bathing (including washing, rinsing, drying)? [] 1   [x] 2   [] 3   [] 4   3. Toileting, which includes using toilet, bedpan or urinal?   [] 1   [] 2   [x] 3   [] 4   4. Putting on and taking off regular upper body clothing? [] 1   [] 2   [] 3   [x] 4   5. Taking care of personal grooming such as brushing teeth? [] 1   [] 2   [] 3   [x] 4   6. Eating meals?    [] 1   [] 2   [] 3   [x] 4   © 2007, Trustees of [address] [place] [address] [address] [address], under license to [employer]. All rights reserved     Score:  Initial: 19 Most Recent: X (Date: -- )    Interpretation of Tool:  Represents activities that are increasingly more difficult (i.e. Bed mobility, Transfers, Gait). Score 24 23 22-20 19-15 14-10 9-7 6     Modifier CH CI CJ CK CL CM CN      ? Self Care:     - CURRENT STATUS: CK - 40%-59% impaired, limited or restricted    - GOAL STATUS: CJ - 20%-39% impaired, limited or restricted    - D/C STATUS:  ---------------To be determined---------------  Payor: JOSE ALEJANDRO / Plan: [address] [place] [address] / Product Type: PPO /      Medical Necessity:     · Patient is expected to demonstrate progress in range of motion, balance and functional technique to increase independence with self care. Reason for Services/Other Comments:  · Patient would benefit from skilled Occupational Therapy to maximize independence and safety with self-care task and functional mobility. .   Use of outcome tool(s) and clinical judgement create a POC that gives a: LOW COMPLEXITY            TREATMENT:   (In addition to Assessment/Re-Assessment sessions the following treatments were rendered)     Pre-treatment Symptoms/Complaints:  Pt without complaint of pain during self care   Pain: Initial:     0 Post Session:  0     Self Care: (40min): Procedure(s) (per grid) utilized to improve and/or restore self-care/home management as related to dressing, bathing, toileting and grooming. Required minimal verbal and   cueing to facilitate activities of daily living skills and compensatory activities. Treatment/Session Assessment:     Response to Treatment:  Pt up to edge of bed and tolerated well.     Education:  [] Home Exercises  [x] Fall Precautions  [] Hip Precautions [] Going Home Video  [x] Knee/Hip Prosthesis Review  [x] Walker Management/Safety [x] Adaptive Equipment as Needed       Interdisciplinary Collaboration:   o Physical Therapist  o Occupational Therapist  o Registered Nurse    After treatment position/precautions:   o Up in chair  o Bed/Chair-wheels locked  o Call light within reach  o RN notified     Compliance with Program/Exercises: compliant all of the time.     Recommendations/Intent for next treatment session:  Pt will do well at home with family      Total Treatment Duration:  OT Patient Time In/Time Out  Time In: 0840  Time Out: 60 [address] [facility],

## 2018-01-15 ENCOUNTER — HOME CARE VISIT (OUTPATIENT)
Dept: SCHEDULING | Facility: HOME HEALTH | Age: 58
End: 2018-01-15
Payer: COMMERCIAL

## 2018-01-15 VITALS
WEIGHT: 235 LBS | SYSTOLIC BLOOD PRESSURE: 129 MMHG | BODY MASS INDEX: 37.77 KG/M2 | HEART RATE: 80 BPM | DIASTOLIC BLOOD PRESSURE: 80 MMHG | RESPIRATION RATE: 16 BRPM | TEMPERATURE: 98.1 F | HEIGHT: 66 IN

## 2018-01-15 PROCEDURE — 400013 HH SOC

## 2018-01-15 PROCEDURE — G0151 HHCP-SERV OF PT,EA 15 MIN: HCPCS

## 2018-01-18 ENCOUNTER — HOME CARE VISIT (OUTPATIENT)
Dept: SCHEDULING | Facility: HOME HEALTH | Age: 58
End: 2018-01-18
Payer: COMMERCIAL

## 2018-01-18 VITALS
SYSTOLIC BLOOD PRESSURE: 92 MMHG | TEMPERATURE: 97.7 F | RESPIRATION RATE: 18 BRPM | DIASTOLIC BLOOD PRESSURE: 60 MMHG | HEART RATE: 56 BPM

## 2018-01-18 PROCEDURE — G0157 HHC PT ASSISTANT EA 15: HCPCS

## 2018-01-22 ENCOUNTER — HOME CARE VISIT (OUTPATIENT)
Dept: SCHEDULING | Facility: HOME HEALTH | Age: 58
End: 2018-01-22
Payer: COMMERCIAL

## 2018-01-22 VITALS
TEMPERATURE: 97.5 F | DIASTOLIC BLOOD PRESSURE: 80 MMHG | RESPIRATION RATE: 18 BRPM | HEART RATE: 62 BPM | SYSTOLIC BLOOD PRESSURE: 120 MMHG

## 2018-01-22 PROCEDURE — G0157 HHC PT ASSISTANT EA 15: HCPCS

## 2018-01-25 ENCOUNTER — HOME CARE VISIT (OUTPATIENT)
Dept: SCHEDULING | Facility: HOME HEALTH | Age: 58
End: 2018-01-25
Payer: COMMERCIAL

## 2018-01-25 PROCEDURE — G0157 HHC PT ASSISTANT EA 15: HCPCS

## 2018-01-26 VITALS
HEART RATE: 56 BPM | SYSTOLIC BLOOD PRESSURE: 102 MMHG | DIASTOLIC BLOOD PRESSURE: 78 MMHG | RESPIRATION RATE: 18 BRPM | TEMPERATURE: 97.1 F

## 2018-01-29 ENCOUNTER — HOME CARE VISIT (OUTPATIENT)
Dept: SCHEDULING | Facility: HOME HEALTH | Age: 58
End: 2018-01-29
Payer: COMMERCIAL

## 2018-01-29 VITALS
DIASTOLIC BLOOD PRESSURE: 88 MMHG | SYSTOLIC BLOOD PRESSURE: 142 MMHG | HEART RATE: 81 BPM | TEMPERATURE: 96.9 F | RESPIRATION RATE: 15 BRPM

## 2018-01-29 PROCEDURE — G0151 HHCP-SERV OF PT,EA 15 MIN: HCPCS

## 2019-08-16 PROBLEM — R06.09 DOE (DYSPNEA ON EXERTION): Status: ACTIVE | Noted: 2019-08-16

## 2019-08-16 PROBLEM — R53.82 CHRONIC FATIGUE: Status: ACTIVE | Noted: 2019-08-16

## 2019-09-24 PROBLEM — Z01.810 PREOP CARDIOVASCULAR EXAM: Status: RESOLVED | Noted: 2018-01-04 | Resolved: 2019-09-24

## 2020-02-28 ENCOUNTER — HOSPITAL ENCOUNTER (OUTPATIENT)
Dept: LAB | Age: 60
Discharge: HOME | End: 2020-02-28
Payer: COMMERCIAL

## 2020-03-02 ENCOUNTER — ANESTHESIA EVENT (OUTPATIENT)
Dept: SURGERY | Age: 60
End: 2020-03-02
Payer: COMMERCIAL

## 2020-03-03 ENCOUNTER — ANESTHESIA (OUTPATIENT)
Dept: SURGERY | Age: 60
End: 2020-03-03
Payer: COMMERCIAL

## 2020-03-03 ENCOUNTER — HOSPITAL ENCOUNTER (OUTPATIENT)
Age: 60
Setting detail: OUTPATIENT SURGERY
Discharge: HOME | End: 2020-03-03
Attending: ORTHOPAEDIC SURGERY | Admitting: ORTHOPAEDIC SURGERY
Payer: COMMERCIAL

## 2020-03-03 VITALS
BODY MASS INDEX: 41.97 KG/M2 | OXYGEN SATURATION: 92 % | SYSTOLIC BLOOD PRESSURE: 151 MMHG | DIASTOLIC BLOOD PRESSURE: 97 MMHG | WEIGHT: 260 LBS | HEART RATE: 93 BPM | TEMPERATURE: 97.4 F | RESPIRATION RATE: 12 BRPM

## 2020-03-03 DIAGNOSIS — L76.82 PAIN AT SURGICAL INCISION: Primary | ICD-10-CM

## 2020-03-03 LAB — POTASSIUM BLD-SCNC: 3.8 MMOL/L (ref 3.5–5.1)

## 2020-03-03 PROCEDURE — 74011250636 HC RX REV CODE- 250/636: Performed by: ANESTHESIOLOGY

## 2020-03-03 PROCEDURE — 77030002934 HC SUT MCRYL J&J -B: Performed by: ORTHOPAEDIC SURGERY

## 2020-03-03 PROCEDURE — 76010000162 HC OR TIME 1.5 TO 2 HR INTENSV-TIER 1: Performed by: ORTHOPAEDIC SURGERY

## 2020-03-03 PROCEDURE — 76210000063 HC OR PH I REC FIRST 0.5 HR: Performed by: ORTHOPAEDIC SURGERY

## 2020-03-03 PROCEDURE — 77030002913 HC SUT ETHBND J&J -B: Performed by: ORTHOPAEDIC SURGERY

## 2020-03-03 PROCEDURE — 74011250637 HC RX REV CODE- 250/637: Performed by: ANESTHESIOLOGY

## 2020-03-03 PROCEDURE — 77030018836 HC SOL IRR NACL ICUM -A: Performed by: ORTHOPAEDIC SURGERY

## 2020-03-03 PROCEDURE — 74011000250 HC RX REV CODE- 250: Performed by: NURSE ANESTHETIST, CERTIFIED REGISTERED

## 2020-03-03 PROCEDURE — 77030041434 HC IMPL CLLGN REGENETEN SN -H1: Performed by: ORTHOPAEDIC SURGERY

## 2020-03-03 PROCEDURE — 77030027384 HC PRB ARTHSCP SERFAS STRY -C: Performed by: ORTHOPAEDIC SURGERY

## 2020-03-03 PROCEDURE — 76210000020 HC REC RM PH II FIRST 0.5 HR: Performed by: ORTHOPAEDIC SURGERY

## 2020-03-03 PROCEDURE — 77030033005 HC TBNG ARTHSC PMP STRY -B: Performed by: ORTHOPAEDIC SURGERY

## 2020-03-03 PROCEDURE — 77030008477 HC STYL SATN SLP COVD -A: Performed by: ANESTHESIOLOGY

## 2020-03-03 PROCEDURE — 77030037088 HC TUBE ENDOTRACH ORAL NSL COVD-A: Performed by: ANESTHESIOLOGY

## 2020-03-03 PROCEDURE — 77030002916 HC SUT ETHLN J&J -A: Performed by: ORTHOPAEDIC SURGERY

## 2020-03-03 PROCEDURE — 77030019605: Performed by: ORTHOPAEDIC SURGERY

## 2020-03-03 PROCEDURE — 77030018673: Performed by: ORTHOPAEDIC SURGERY

## 2020-03-03 PROCEDURE — 74011250636 HC RX REV CODE- 250/636: Performed by: NURSE ANESTHETIST, CERTIFIED REGISTERED

## 2020-03-03 PROCEDURE — 77030002933 HC SUT MCRYL J&J -A: Performed by: ORTHOPAEDIC SURGERY

## 2020-03-03 PROCEDURE — 77030003602 HC NDL NRV BLK BBMI -B: Performed by: ANESTHESIOLOGY

## 2020-03-03 PROCEDURE — C1713 ANCHOR/SCREW BN/BN,TIS/BN: HCPCS | Performed by: ORTHOPAEDIC SURGERY

## 2020-03-03 PROCEDURE — 77030006891 HC BLD SHV RESECT STRY -B: Performed by: ORTHOPAEDIC SURGERY

## 2020-03-03 PROCEDURE — 77030040361 HC SLV COMPR DVT MDII -B: Performed by: ORTHOPAEDIC SURGERY

## 2020-03-03 PROCEDURE — 84132 ASSAY OF SERUM POTASSIUM: CPT

## 2020-03-03 PROCEDURE — 77030036560 HC SHLDR ARM PAD ABDUCTN S2SG -B: Performed by: ORTHOPAEDIC SURGERY

## 2020-03-03 PROCEDURE — 77030004453 HC BUR SHV STRY -B: Performed by: ORTHOPAEDIC SURGERY

## 2020-03-03 PROCEDURE — 74011250636 HC RX REV CODE- 250/636: Performed by: ORTHOPAEDIC SURGERY

## 2020-03-03 PROCEDURE — 77030008462 HC STPLR SKN PROX J&J -A: Performed by: ORTHOPAEDIC SURGERY

## 2020-03-03 PROCEDURE — 77030039425 HC BLD LARYNG TRULITE DISP TELE -A: Performed by: ANESTHESIOLOGY

## 2020-03-03 PROCEDURE — 76942 ECHO GUIDE FOR BIOPSY: CPT | Performed by: ORTHOPAEDIC SURGERY

## 2020-03-03 PROCEDURE — 77030019908 HC STETH ESOPH SIMS -A: Performed by: ANESTHESIOLOGY

## 2020-03-03 PROCEDURE — 77030003666 HC NDL SPINAL BD -A: Performed by: ORTHOPAEDIC SURGERY

## 2020-03-03 PROCEDURE — 76010010054 HC POST OP PAIN BLOCK: Performed by: ORTHOPAEDIC SURGERY

## 2020-03-03 PROCEDURE — 76060000035 HC ANESTHESIA 2 TO 2.5 HR: Performed by: ORTHOPAEDIC SURGERY

## 2020-03-03 DEVICE — BONE ANCHORS 3 WITH ARTHROSCOPIC DELIVERY SYSTEM ADVANCED
Type: IMPLANTABLE DEVICE | Site: SHOULDER | Status: FUNCTIONAL
Brand: BONE ANCHORS WITH ARTHROSCOPIC DELIVERY SYSTEM - ADVANCED

## 2020-03-03 DEVICE — ANCHOR SHLDR TEND 8 BIOINDCTVE -- USE W/2503-A FORMERLY 2516-1: Type: IMPLANTABLE DEVICE | Site: SHOULDER | Status: FUNCTIONAL

## 2020-03-03 DEVICE — IMPLANTABLE DEVICE
Type: IMPLANTABLE DEVICE | Site: SHOULDER | Status: FUNCTIONAL
Brand: BIOINDUCTIVE IMPLANT WITH ARTHROSCOPIC DELIVERY SYSTEM - MEDIUM

## 2020-03-03 RX ORDER — FENTANYL CITRATE 50 UG/ML
100 INJECTION, SOLUTION INTRAMUSCULAR; INTRAVENOUS ONCE
Status: COMPLETED | OUTPATIENT
Start: 2020-03-03 | End: 2020-03-03

## 2020-03-03 RX ORDER — DEXAMETHASONE SODIUM PHOSPHATE 4 MG/ML
INJECTION, SOLUTION INTRA-ARTICULAR; INTRALESIONAL; INTRAMUSCULAR; INTRAVENOUS; SOFT TISSUE AS NEEDED
Status: DISCONTINUED | OUTPATIENT
Start: 2020-03-03 | End: 2020-03-03 | Stop reason: HOSPADM

## 2020-03-03 RX ORDER — ROCURONIUM BROMIDE 10 MG/ML
INJECTION, SOLUTION INTRAVENOUS AS NEEDED
Status: DISCONTINUED | OUTPATIENT
Start: 2020-03-03 | End: 2020-03-03 | Stop reason: HOSPADM

## 2020-03-03 RX ORDER — EPHEDRINE SULFATE/0.9% NACL/PF 50 MG/5 ML
SYRINGE (ML) INTRAVENOUS AS NEEDED
Status: DISCONTINUED | OUTPATIENT
Start: 2020-03-03 | End: 2020-03-03 | Stop reason: HOSPADM

## 2020-03-03 RX ORDER — SODIUM CHLORIDE, SODIUM LACTATE, POTASSIUM CHLORIDE, CALCIUM CHLORIDE 600; 310; 30; 20 MG/100ML; MG/100ML; MG/100ML; MG/100ML
75 INJECTION, SOLUTION INTRAVENOUS CONTINUOUS
Status: DISCONTINUED | OUTPATIENT
Start: 2020-03-03 | End: 2020-03-03 | Stop reason: HOSPADM

## 2020-03-03 RX ORDER — DIPHENHYDRAMINE HYDROCHLORIDE 50 MG/ML
12.5 INJECTION, SOLUTION INTRAMUSCULAR; INTRAVENOUS ONCE
Status: DISCONTINUED | OUTPATIENT
Start: 2020-03-03 | End: 2020-03-03 | Stop reason: HOSPADM

## 2020-03-03 RX ORDER — EPINEPHRINE 1 MG/ML
INJECTION INTRAMUSCULAR; INTRAVENOUS; SUBCUTANEOUS AS NEEDED
Status: DISCONTINUED | OUTPATIENT
Start: 2020-03-03 | End: 2020-03-03 | Stop reason: HOSPADM

## 2020-03-03 RX ORDER — ONDANSETRON 2 MG/ML
4 INJECTION INTRAMUSCULAR; INTRAVENOUS ONCE
Status: DISCONTINUED | OUTPATIENT
Start: 2020-03-03 | End: 2020-03-03 | Stop reason: HOSPADM

## 2020-03-03 RX ORDER — MIDAZOLAM HYDROCHLORIDE 1 MG/ML
2 INJECTION, SOLUTION INTRAMUSCULAR; INTRAVENOUS
Status: COMPLETED | OUTPATIENT
Start: 2020-03-03 | End: 2020-03-03

## 2020-03-03 RX ORDER — ONDANSETRON 2 MG/ML
INJECTION INTRAMUSCULAR; INTRAVENOUS AS NEEDED
Status: DISCONTINUED | OUTPATIENT
Start: 2020-03-03 | End: 2020-03-03 | Stop reason: HOSPADM

## 2020-03-03 RX ORDER — SUCCINYLCHOLINE CHLORIDE 20 MG/ML
INJECTION INTRAMUSCULAR; INTRAVENOUS AS NEEDED
Status: DISCONTINUED | OUTPATIENT
Start: 2020-03-03 | End: 2020-03-03 | Stop reason: HOSPADM

## 2020-03-03 RX ORDER — SODIUM CHLORIDE, SODIUM LACTATE, POTASSIUM CHLORIDE, CALCIUM CHLORIDE 600; 310; 30; 20 MG/100ML; MG/100ML; MG/100ML; MG/100ML
100 INJECTION, SOLUTION INTRAVENOUS CONTINUOUS
Status: DISCONTINUED | OUTPATIENT
Start: 2020-03-03 | End: 2020-03-03 | Stop reason: HOSPADM

## 2020-03-03 RX ORDER — CEFAZOLIN SODIUM/WATER 2 G/20 ML
2 SYRINGE (ML) INTRAVENOUS ONCE
Status: COMPLETED | OUTPATIENT
Start: 2020-03-03 | End: 2020-03-03

## 2020-03-03 RX ORDER — OXYCODONE AND ACETAMINOPHEN 5; 325 MG/1; MG/1
1 TABLET ORAL
Qty: 40 TAB | Refills: 0 | Status: SHIPPED | OUTPATIENT
Start: 2020-03-03 | End: 2020-03-10

## 2020-03-03 RX ORDER — GUAIFENESIN 100 MG/5ML
81 LIQUID (ML) ORAL
Status: COMPLETED | OUTPATIENT
Start: 2020-03-03 | End: 2020-03-03

## 2020-03-03 RX ORDER — LIDOCAINE HYDROCHLORIDE 10 MG/ML
0.1 INJECTION INFILTRATION; PERINEURAL AS NEEDED
Status: DISCONTINUED | OUTPATIENT
Start: 2020-03-03 | End: 2020-03-03 | Stop reason: HOSPADM

## 2020-03-03 RX ORDER — MIDAZOLAM HYDROCHLORIDE 1 MG/ML
2 INJECTION, SOLUTION INTRAMUSCULAR; INTRAVENOUS ONCE
Status: DISCONTINUED | OUTPATIENT
Start: 2020-03-03 | End: 2020-03-03 | Stop reason: HOSPADM

## 2020-03-03 RX ORDER — PROPOFOL 10 MG/ML
INJECTION, EMULSION INTRAVENOUS AS NEEDED
Status: DISCONTINUED | OUTPATIENT
Start: 2020-03-03 | End: 2020-03-03 | Stop reason: HOSPADM

## 2020-03-03 RX ORDER — LIDOCAINE HYDROCHLORIDE 20 MG/ML
INJECTION, SOLUTION EPIDURAL; INFILTRATION; INTRACAUDAL; PERINEURAL AS NEEDED
Status: DISCONTINUED | OUTPATIENT
Start: 2020-03-03 | End: 2020-03-03 | Stop reason: HOSPADM

## 2020-03-03 RX ORDER — ACETAMINOPHEN 500 MG
1000 TABLET ORAL ONCE
Status: COMPLETED | OUTPATIENT
Start: 2020-03-03 | End: 2020-03-03

## 2020-03-03 RX ORDER — ROPIVACAINE HYDROCHLORIDE 5 MG/ML
INJECTION, SOLUTION EPIDURAL; INFILTRATION; PERINEURAL
Status: COMPLETED | OUTPATIENT
Start: 2020-03-03 | End: 2020-03-03

## 2020-03-03 RX ORDER — NALOXONE HYDROCHLORIDE 0.4 MG/ML
0.1 INJECTION, SOLUTION INTRAMUSCULAR; INTRAVENOUS; SUBCUTANEOUS AS NEEDED
Status: DISCONTINUED | OUTPATIENT
Start: 2020-03-03 | End: 2020-03-03 | Stop reason: HOSPADM

## 2020-03-03 RX ADMIN — DEXAMETHASONE SODIUM PHOSPHATE 4 MG: 4 INJECTION, SOLUTION INTRAMUSCULAR; INTRAVENOUS at 08:04

## 2020-03-03 RX ADMIN — FENTANYL CITRATE 100 MCG: 50 INJECTION, SOLUTION INTRAMUSCULAR; INTRAVENOUS at 06:52

## 2020-03-03 RX ADMIN — MIDAZOLAM 2 MG: 1 INJECTION INTRAMUSCULAR; INTRAVENOUS at 06:52

## 2020-03-03 RX ADMIN — SODIUM CHLORIDE, SODIUM LACTATE, POTASSIUM CHLORIDE, AND CALCIUM CHLORIDE: 600; 310; 30; 20 INJECTION, SOLUTION INTRAVENOUS at 08:19

## 2020-03-03 RX ADMIN — ACETAMINOPHEN 1000 MG: 500 TABLET, FILM COATED ORAL at 06:53

## 2020-03-03 RX ADMIN — Medication 15 MG: at 07:41

## 2020-03-03 RX ADMIN — Medication 10 MG: at 08:18

## 2020-03-03 RX ADMIN — Medication 10 MG: at 08:40

## 2020-03-03 RX ADMIN — SUCCINYLCHOLINE CHLORIDE 180 MG: 20 INJECTION, SOLUTION INTRAMUSCULAR; INTRAVENOUS at 07:35

## 2020-03-03 RX ADMIN — LIDOCAINE HYDROCHLORIDE 80 MG: 20 INJECTION, SOLUTION EPIDURAL; INFILTRATION; INTRACAUDAL; PERINEURAL at 07:35

## 2020-03-03 RX ADMIN — Medication 2 G: at 07:25

## 2020-03-03 RX ADMIN — ROPIVACAINE HYDROCHLORIDE 30 ML: 150 INJECTION, SOLUTION EPIDURAL; INFILTRATION; PERINEURAL at 06:55

## 2020-03-03 RX ADMIN — SODIUM CHLORIDE, SODIUM LACTATE, POTASSIUM CHLORIDE, AND CALCIUM CHLORIDE 100 ML/HR: 600; 310; 30; 20 INJECTION, SOLUTION INTRAVENOUS at 06:52

## 2020-03-03 RX ADMIN — PROPOFOL 200 MG: 10 INJECTION, EMULSION INTRAVENOUS at 07:35

## 2020-03-03 RX ADMIN — SODIUM CHLORIDE, SODIUM LACTATE, POTASSIUM CHLORIDE, AND CALCIUM CHLORIDE: 600; 310; 30; 20 INJECTION, SOLUTION INTRAVENOUS at 07:25

## 2020-03-03 RX ADMIN — ONDANSETRON 4 MG: 2 INJECTION INTRAMUSCULAR; INTRAVENOUS at 08:04

## 2020-03-03 RX ADMIN — ROCURONIUM BROMIDE 10 MG: 10 INJECTION, SOLUTION INTRAVENOUS at 07:35

## 2020-03-03 RX ADMIN — ASPIRIN 81 MG 81 MG: 81 TABLET ORAL at 07:10

## 2020-03-03 RX ADMIN — Medication 12.5 MG: at 08:03

## 2020-03-03 NOTE — OP NOTES
[address] [facility]  OPERATIVE REPORT    Name:  Quyen Roque  MR#:  [id number]  :  1960  ACCOUNT #:  [de-identified]  DATE OF SERVICE:  2020    PREOPERATIVE DIAGNOSES:  Left shoulder impingement, acromioclavicular arthritis, biceps tendinosis, partial-thickness rotator cuff tear. POSTOPERATIVE DIAGNOSES:  Left shoulder impingement, acromioclavicular arthritis, biceps tendinosis, partial-thickness rotator cuff tear. PROCEDURES PERFORMED:  Left shoulder arthroscopy, arthroscopic subacromial decompression, arthroscopic biceps tenotomy, arthroscopic distal clavicle resection, mini open rotator cuff repair with Regeneten patch. SURGEON:  Michela Mcnamara MD    ASSISTANT:  None. ANESTHESIA:  General with preoperative interscalene. COMPLICATIONS:  None. SPECIMENS REMOVED:  No specimens were sent to Pathology. IMPLANTS:  Regeneten patch with tendon and bone anchors. ESTIMATED BLOOD LOSS:  Minimal.    PROCEDURE IN DETAIL:  After discussion of risks, benefits, and alternatives, the patient expressed understanding and strongly desired to proceed. Under sterile conditions and after time-out verification, procedure was performed. The left shoulder was prepped and draped in normal sterile fashion. Time-out was then performed. There was a full range of motion of the shoulder. Diagnostic arthroscopy demonstrated extensive degenerative labral tearing and biceps tendinosis. The degenerative edges of the labrum were debrided to a stable base. Arthroscopic biceps tenotomy was performed. Partial-thickness rotator cuff tearing and tendinopathy were demonstrated involving the supraspinatus primarily in addition to the subscapularis to a lesser degree. Attention was then turned to the subacromial space. The acromion and distal clavicle both appeared to be sources of osseous impingement. Arthroscopic subacromial decompression and distal clavicle resection were performed.   There was no full-thickness tearing of the rotator cuff demonstrated. Anterolateral deltoid splitting incision was created. This had to be somewhat larger due to the patient's high BMI, which significantly lengthened duration of the case and made the exposure and overall case more difficult. At the rotator cuff under direct visualization, a Regeneten patch was placed using standard technique. Tendon anchors were employed medially. Bone anchors were employed laterally. The patch was appropriately tacked down and covered the supraspinatus insertion appropriately. Palpation of the subacromial and acromioclavicular intervals demonstrated excellent decompression. The wound was copiously irrigated and closed in layered fashion with full-thickness reapproximation of the deltoid split. Sterile dressings were applied. The patient tolerated the procedure well with no complications. POSTOPERATIVE PLAN:  Early range of motion with no rotator cuff precautions. She will start outpatient physical therapy at the end of this week or the beginning of next week. P.o. analgesia.         Gucci Crain MD      [id number]  D:  03/03/2020 9:16  T:  03/03/2020 15:22  JOB #:  [id number]  CC:  Selin Ruffin MD

## 2020-03-03 NOTE — H&P
Pre Operative History and Physical Exam    Patient ID:  Liliya Collins  [id number]  42 y.o.  1960    Today: March 3, 2020       Assessment:   1. Left shoulder impingement, AC arthritis, partial thickness RCT, biceps tendinosis        Plan:    1. Proceed with scheduled Procedure(s) (LRB):  LEFT SHOULDER ARTHROSCOPY SUBACROMIAL DECOMPRESSION / POSS DISTAL CLAVICLE RESECTION / ARTHROSCOPIC BICEPS TENOTOMY / MINI OPEN ROTATOR CUFF REPAIR / PLACEMENT OF REGENETEN PATCH (Left)            CC: Left shoulder pain    HPI:   The patient has  Left shoulder impingement, ACOA, partial thickness RCT, biceps tendinosis by MRI & clinical evaluation. The patient was evaluated and examined during a consultation prior to this office visit. There have been no changes to the patient's orthopedic condition since the initial consultation. The patient has failed previous conservative treatment for this condition.  The patient will present the day of surgery for Procedure(s) (LRB):  LEFT SHOULDER ARTHROSCOPY SUBACROMIAL DECOMPRESSION / POSS DISTAL CLAVICLE RESECTION / ARTHROSCOPIC BICEPS TENOTOMY / MINI OPEN ROTATOR CUFF REPAIR / PLACEMENT OF REGENETEN PATCH (Left)      Past Medical/Surgical History:  Past Medical History:   Diagnosis Date    Arthritis     CAD (coronary artery disease)     one stent    Former cigarette smoker     GERD (gastroesophageal reflux disease)     controlled with med     Hypertension     on med for control     Morbid obesity ([facility] [address]     BMI=38.0    Other chest pain 2017    Palpitations 2017    Psychiatric disorder     On cymbalta for depression     PUD (peptic ulcer disease)     hx; no current problems    Sleep apnea     recently diagnosed; instructed to bring c-pap dos if received by then; on 20 pt reports not using c-pap    Thyroid disease     hypo     Past Surgical History:   Procedure Laterality Date    HX CATARACT REMOVAL      bilateral    HX  SECTION  , 1993    HX CORONARY STENT PLACEMENT  01/11/2012     3.5 x 32 Ion ALEE to proximal RCA by Dr Bhupinder Coffey  03/11/2013    LM:nl,pLAD:30% stenosis,LCX:mild irregularities. .RCA:dominant vessel with patent long stented segment. 40-50% ostial stenosis with FFR=0.89. LV:nl EF.  HX HEENT      Thyroidectomy    HX HYSTERECTOMY      with bladder tac    HX ORTHOPAEDIC      Right shoulder surgery    HX ORTHOPAEDIC      bilateral carpal tunnel    HX ORTHOPAEDIC      foot surgery        Allergies:    Allergies   Allergen Reactions    Codeine Itching    Macrobid [Nitrofurantoin Monohyd/M-Cryst] Itching    Tramadol Itching        Physical Exam:   General: NAD, Alert, Oriented, Appears their stated age     [de-identified]: NC/AT, PERRL    Skin: No rashes, lesions or wounds seen      Psych: normal affect      Heart: Regular Rate, Rhythm     Lungs: unlabored respirations, normal breath sounds     Abdomen: Soft and non-distended     Ortho:  + impingement sign, pain with RC testing    Neuro: no focal defects, sensation is equal bilaterally     Lymph: no lymphadenopathy     Meds:   Current Facility-Administered Medications   Medication Dose Route Frequency    lidocaine (XYLOCAINE) 10 mg/mL (1 %) injection 0.1 mL  0.1 mL SubCUTAneous PRN    lactated Ringers infusion  100 mL/hr IntraVENous CONTINUOUS    midazolam (VERSED) injection 2 mg  2 mg IntraVENous ONCE    ceFAZolin (ANCEF) 2 g/20 mL in sterile water IV syringe  2 g IntraVENous ONCE         Labs:  Admission on 03/03/2020   Component Date Value Ref Range Status    Potassium, POC 03/03/2020 3.8  3.5 - 5.1 MMOL/L Final   Hospital Outpatient Visit on 02/25/2020   Component Date Value Ref Range Status    Potassium 02/28/2020 3.6  3.5 - 5.1 mmol/L Final                 Patient Active Problem List   Diagnosis Code    Unstable angina (HCC) I20.0    Dyslipidemia E78.5    Obesity E66.9    Hypertension I10    Tobacco abuse Z72.0    Coronary atherosclerosis of native coronary artery I25.10    Accelerated hypertension I10    Other chest pain R07.89    Palpitations R00.2    Knee arthropathy M17.10    COOPER (dyspnea on exertion) R06.09    Chronic fatigue R53.82         Signed By: Jennifer Griffith MD  March 3, 2020

## 2020-03-03 NOTE — PERIOP NOTES
PACU DISCHARGE NOTE    Vital signs stable, pain well controlled, alert and oriented times three or at baseline, follow up per surgeon, no anesthetic complications. Discharge instructions and prescription for Norco given to pt and her , Margarita Laurent. E-sign not functioning in [facility]. Paper signatures obtained. Pt is tolerating PO, has voided x 2, is without c/o pain or discomfort, and has had her IV removed intact. Pt to discharge door via wheelchair and left in care of Margarita Laurent who has her belongings.

## 2020-03-03 NOTE — ANESTHESIA PROCEDURE NOTES
Peripheral Block    Start time: 3/3/2020 6:52 AM  End time: 3/3/2020 6:55 AM  Performed by: Leroy Hammond MD  Authorized by: Leroy Hammond MD       Pre-procedure: Indications: at surgeon's request, post-op pain management and procedure for pain    Preanesthetic Checklist: patient identified, risks and benefits discussed, site marked, timeout performed, anesthesia consent given and patient being monitored    Timeout Time: 06:52          Block Type:   Block Type:   Interscalene  Laterality:  Left  Monitoring:  Standard ASA monitoring, responsive to questions, oxygen, continuous pulse ox, frequent vital sign checks and heart rate  Injection Technique:  Single shot  Procedures: ultrasound guided and nerve stimulator    Patient Position: seated  Prep: chlorhexidine    Location:  Interscalene  Needle Type:  Stimuplex  Needle Gauge:  22 G  Needle Localization:  Ultrasound guidance and nerve stimulator  Motor Response: minimal motor response >0.4 mA      Assessment:  Number of attempts:  1  Injection Assessment:  Incremental injection every 5 mL, no paresthesia, negative aspiration for CSF, local visualized surrounding nerve on ultrasound, negative aspiration for blood, no intravascular symptoms and ultrasound image on chart  Patient tolerance:  Patient tolerated the procedure well with no immediate complications

## 2020-03-03 NOTE — ANESTHESIA POSTPROCEDURE EVALUATION
Procedure(s):  LEFT SHOULDER ARTHROSCOPY SUBACROMIAL DECOMPRESSION / DISTAL CLAVICLE RESECTION / ARTHROSCOPIC BICEPS TENOTOMY / MINI OPEN ROTATOR CUFF REPAIR / PLACEMENT OF REGENETEN PATCH. general    Anesthesia Post Evaluation      Multimodal analgesia: multimodal analgesia used between 6 hours prior to anesthesia start to PACU discharge  Patient location during evaluation: bedside  Patient participation: complete - patient participated  Level of consciousness: responsive to verbal stimuli  Pain management: adequate  Airway patency: patent  Anesthetic complications: no  Cardiovascular status: hemodynamically stable  Respiratory status: spontaneous ventilation  Hydration status: stable        Vitals Value Taken Time   /97 3/3/2020 10:14 AM   Temp 35.7 °C (96.2 °F) 3/3/2020  9:28 AM   Pulse 93 3/3/2020 10:14 AM   Resp 12 3/3/2020  9:59 AM   SpO2 92 % 3/3/2020 10:14 AM   Vitals shown include unvalidated device data.

## 2020-03-03 NOTE — DISCHARGE INSTRUCTIONS
Keep clean & dry  OK to change dressing in 3-4 days to waterproof dressing  Use of surgical arm as tolerated  Start pendulum exercises  Pain meds as directed  Call office for questions or problems        ACTIVITY  · As tolerated and as directed by your doctor. · Bathe or shower as directed by your doctor. DIET  · Clear liquids until no nausea or vomiting; then light diet for the first day. · Advance to regular diet on second day, unless your doctor orders otherwise. · If nausea and vomiting continues, call your doctor. PAIN  · Take pain medication as directed by your doctor. · Call your doctor if pain is NOT relieved by medication. · DO NOT take aspirin of blood thinners unless directed by your doctor. CALL YOUR DOCTOR IF   · Excessive bleeding that does not stop after holding pressure over the area  · Temperature of 101 degrees F or above  · Excessive redness, swelling or bruising, and/ or green or yellow, smelly discharge from incision    AFTER ANESTHESIA   · For the first 24 hours: DO NOT Drive, Drink alcoholic beverages, or Make important decisions. · Be aware of dizziness following anesthesia and while taking pain medication. After general anesthesia or intravenous sedation, for 24 hours or while taking prescription Narcotics:  · Limit your activities  · Do not drive and operate hazardous machinery  · Do not make important personal or business decisions  · Do  not drink alcoholic beverages  · If you have not urinated within 8 hours after discharge, please contact your surgeon on call. *  Please give a list of your current medications to your Primary Care Provider. *  Please update this list whenever your medications are discontinued, doses are      changed, or new medications (including over-the-counter products) are added. *  Please carry medication information at all times in case of emergency situations.       These are general instructions for a healthy lifestyle:    No smoking/ No tobacco products/ Avoid exposure to second hand smoke    Surgeon General's Warning:  Quitting smoking now greatly reduces serious risk to your health. Obesity, smoking, and sedentary lifestyle greatly increases your risk for illness    A healthy diet, regular physical exercise & weight monitoring are important for maintaining a healthy lifestyle    You may be retaining fluid if you have a history of heart failure or if you experience any of the following symptoms:  Weight gain of 3 pounds or more overnight or 5 pounds in a week, increased swelling in our hands or feet or shortness of breath while lying flat in bed. Please call your doctor as soon as you notice any of these symptoms; do not wait until your next office visit. Recognize signs and symptoms of STROKE:    F-face looks uneven    A-arms unable to move or move unevenly    S-speech slurred or non-existent    T-time-call 911 as soon as signs and symptoms begin-DO NOT go       Back to bed or wait to see if you get better-TIME IS BRAIN.

## 2020-03-03 NOTE — ANESTHESIA PREPROCEDURE EVALUATION
Anesthetic History   No history of anesthetic complications            Review of Systems / Medical History  Patient summary reviewed and pertinent labs reviewed    Pulmonary        Sleep apnea: CPAP  Smoker         Neuro/Psych         Psychiatric history     Cardiovascular    Hypertension          CAD    Exercise tolerance: >4 METS  Comments: ALEE 2013.  Recent episode on CP found to be noncardiac in nature with negative NST    Denies recent CP, SOB, Palps, Syncope, Fatigue   GI/Hepatic/Renal     GERD: well controlled           Endo/Other      Hypothyroidism  Morbid obesity and arthritis     Other Findings   Comments: Sciatica with recent completion of medrol dose pack           Physical Exam    Airway  Mallampati: II  TM Distance: 4 - 6 cm  Neck ROM: normal range of motion   Mouth opening: Normal     Cardiovascular    Rhythm: regular  Rate: normal         Dental  No notable dental hx       Pulmonary  Breath sounds clear to auscultation               Abdominal  GI exam deferred       Other Findings            Anesthetic Plan    ASA: 3  Anesthesia type: general      Post-op pain plan if not by surgeon: peripheral nerve block single      Anesthetic plan and risks discussed with: Patient

## 2020-04-24 ENCOUNTER — HOSPITAL ENCOUNTER (OUTPATIENT)
Dept: LAB | Age: 60
Discharge: HOME | End: 2020-04-24
Payer: COMMERCIAL

## 2020-04-24 LAB
CRP SERPL-MCNC: 0.9 MG/DL (ref 0–0.9)
ERYTHROCYTE [SEDIMENTATION RATE] IN BLOOD: 18 MM/HR (ref 0–30)
RHEUMATOID FACT SER QL LA: NEGATIVE

## 2020-04-24 PROCEDURE — 86430 RHEUMATOID FACTOR TEST QUAL: CPT

## 2020-04-24 PROCEDURE — 85652 RBC SED RATE AUTOMATED: CPT

## 2020-04-24 PROCEDURE — 36415 COLL VENOUS BLD VENIPUNCTURE: CPT

## 2020-04-24 PROCEDURE — 86200 CCP ANTIBODY: CPT

## 2020-04-24 PROCEDURE — 86140 C-REACTIVE PROTEIN: CPT

## 2020-04-26 LAB — CCP IGA+IGG SERPL IA-ACNC: 11 UNITS (ref 0–19)

## 2022-03-19 PROBLEM — R07.89 OTHER CHEST PAIN: Status: ACTIVE | Noted: 2017-11-16

## 2022-03-19 PROBLEM — M17.10 KNEE ARTHROPATHY: Status: ACTIVE | Noted: 2018-01-12

## 2022-03-19 PROBLEM — R53.82 CHRONIC FATIGUE: Status: ACTIVE | Noted: 2019-08-16

## 2022-03-19 PROBLEM — R00.2 PALPITATIONS: Status: ACTIVE | Noted: 2017-11-16

## 2022-03-19 PROBLEM — R06.09 DOE (DYSPNEA ON EXERTION): Status: ACTIVE | Noted: 2019-08-16

## 2022-05-24 ENCOUNTER — OFFICE VISIT (OUTPATIENT)
Dept: NEUROLOGY | Age: 62
End: 2022-05-24
Payer: COMMERCIAL

## 2022-05-24 VITALS — WEIGHT: 271 LBS | SYSTOLIC BLOOD PRESSURE: 173 MMHG | DIASTOLIC BLOOD PRESSURE: 119 MMHG | HEART RATE: 61 BPM

## 2022-05-24 DIAGNOSIS — R20.2 PARESTHESIA OF BOTH LOWER EXTREMITIES: ICD-10-CM

## 2022-05-24 DIAGNOSIS — R20.2 NUMBNESS AND TINGLING IN BOTH HANDS: Primary | ICD-10-CM

## 2022-05-24 DIAGNOSIS — M79.644 PAIN OF RIGHT THUMB: ICD-10-CM

## 2022-05-24 DIAGNOSIS — R26.9 GAIT DISORDER: ICD-10-CM

## 2022-05-24 DIAGNOSIS — G56.03 CARPAL TUNNEL SYNDROME, BILATERAL: ICD-10-CM

## 2022-05-24 DIAGNOSIS — R20.0 NUMBNESS AND TINGLING IN BOTH HANDS: Primary | ICD-10-CM

## 2022-05-24 PROCEDURE — 95885 MUSC TST DONE W/NERV TST LIM: CPT | Performed by: PSYCHIATRY & NEUROLOGY

## 2022-05-24 PROCEDURE — 99203 OFFICE O/P NEW LOW 30 MIN: CPT | Performed by: PSYCHIATRY & NEUROLOGY

## 2022-05-24 PROCEDURE — 95913 NRV CNDJ TEST 13/> STUDIES: CPT | Performed by: PSYCHIATRY & NEUROLOGY

## 2022-05-24 ASSESSMENT — VISUAL ACUITY: OU: 1

## 2022-05-24 ASSESSMENT — ENCOUNTER SYMPTOMS
GASTROINTESTINAL NEGATIVE: 1
RESPIRATORY NEGATIVE: 1

## 2022-05-24 NOTE — PATIENT INSTRUCTIONS
Patient Education        Learning About Peripheral Neuropathy  What is peripheral neuropathy? Peripheral neuropathy is a problem that affects the peripheral nerves. These nerves lead from the spinal cord to other parts of the body. They control yoursense of touch, how you feel pain and temperature, and your muscle strength. Most of the time the problem starts in the fingers and toes. As it gets worse, it moves into the limbs. It can cause pain and loss of feeling in the feet,legs, and hands. What causes it? There are several causes:   Diabetes. This is the most common cause. If your blood sugar is too high for too long, it can damage the nerves.  Kidney problems. These can lead to toxic substances in the blood that damage nerves.  Low levels of thyroid hormone (hypothyroidism). This may cause swelling of the tissues around the nerves, which can put pressure on them.  Infectious or inflammatory diseases. Examples are HIV and Guillain-Barré syndrome. These diseases can damage the nerves.  Peripheral nerve injury. A physical injury can damage the nerves. Injuries can be from things like falls, car crashes, or playing sports.  Overusing alcohol and not eating a healthy diet. These can lead to your body not having enough of certain vitamins, such as vitamin B-12. This can damage nerves.  Being exposed to toxic substances. These include lead, mercury, and certain medicines, such as those used for chemotherapy. Sometimes the cause isn't known. What are the symptoms? Symptoms of peripheral neuropathy can occur slowly over time. The most commonones are:   Numbness, tightness, and tingling, especially in the legs, hands, and feet.  Loss of feeling.  Burning, shooting, or stabbing pain in the legs, hands, and feet. Often the pain is worse at night.  Weakness and loss of balance. What can happen if you have it?   If peripheral neuropathy gets worse, it can lead to a complete lack of feeling in your hands or feet. This can make you more likely to injure them. It may lead to calluses and blisters. It can also lead to bone and joint problems,infection, and ulcers. For instance, small, repeated injuries to the foot may lead to bigger problems. This can happen because you can't feel the injuries. Reduced feeling in thefeet can also change your step, leading to bone or joint problems. If untreated, foot problems can become so severe that the foot or lower leg may have to be amputated. But treatment can slow down peripheral neuropathy. Andit's a good idea to take care to avoid injury. How is it diagnosed? To diagnose peripheral neuropathy, your doctor will ask you about:   Your symptoms.  Your medical history. This may include your use of alcohol, risk of HIV infection, or exposure to toxic substances.  Your family's medical history, including nerve disease. Your doctor may test how well you can feel touch, temperature, and pain. You may also have blood tests. These tests will help the doctor find out if you have conditions that can cause neuropathy. Examples are diabetes, vitamindeficiencies, thyroid disease, and kidney problems. How is it treated? Treatment for peripheral neuropathy can relieve symptoms. This is done by treating the health problem that's causing it. For example, if you have diabetes, keeping your blood sugar within your target range may help. Or maybe your body lacks certain vitamins caused by drinking too much alcohol. In that case, treatment may include eating a healthy diet, taking vitamins, andstopping alcohol use. You may have physical therapy. This can increase muscle strength and help build muscle control. Over-the-counter medicine can relieve mild nerve pain. Your doctor may also prescribe medicine to help with severe pain, numbness, tingling, and weakness. If you have neuropathy in your feet, it's a good ideato have them checked during each office visit.  This can help prevent problems. Some people find that physical therapy, acupuncture, or transcutaneouselectrical nerve stimulation (TENS) helps relieve pain. Follow-up care is a key part of your treatment and safety. Be sure to make and go to all appointments, and call your doctor if you are having problems. It's also a good idea to know your test results and keep alist of the medicines you take. Where can you learn more? Go to https://[email or web address]. org and sign in to your [employer] account. Enter P130 in the FlyClip box to learn more about \"Learning About Peripheral Neuropathy. \"     If you do not have an account, please click on the \"Sign Up Now\" link. Current as of: July 28, 2021               Content Version: 13.2  © 4058-9921 Healthwise, Incorporated. Care instructions adapted under license by [facility] ([facility]). If you have questions about a medical condition or this instruction, always ask your healthcare professional. [place] [address] any warranty or liability for your use of this information.

## 2022-05-24 NOTE — PROGRESS NOTES
EMG/Nerve Conduction Study Procedure Note  [facility], [facility] [place]   [phone]      Hx:    Exam:     59 y.o. RH female referred for EMG/NCV of the upper extremities for persistent numbness and tingling r/o BCTS. Hx of BCTR x 2. No hx of DM. No atrophy. Thumb joints tender to palpation. Summary             Needle EMG selected hand mm. UE NCV SCV MCV. 1.      Controlled environmental factors / EMG lab. Temperature. 2. NCV : sensory segments:    Abnormal = = slowed SCV of bilateral median nerves with attenuated SNAP amplitudes - with slightly abnormal SCV of the bilateral ulnar SCV = attenuated SNAP amplitudes only. The bilateral radial SCV SNAP normal.   3. NCV transcarpal sensory segments:  Abnormal = = bilateral median transcarpal segment SCV = slowed with normal bilateral ulnar transcarpal segments normal.  Peak difference latencies at 1.17 msec left and 1.02 msec right  (UL = 0.20 msec). 4. NCV Motor MCV segments:     Normal bilateral median and ulnar MCV CMAP. 5. F-wave studies:         Normal bilateral median and ulnar F-waves. 6. H-REFLEX Studies:    Deferred. 7.  NEEDLE EMG:   Tested muscles[de-identified]    Normal bilateral FDI APB ADM mm =  Normal insertional activity and interference pattern/recruitment. No fasciculations fibrillations positive sharp waves. Normal MUP. No BSS AP. No giant MUP. No myotonia. No upper motor neuron sign. INTERPRETATION: THESE FINDINGS ARE ELECTROPHYSIOLOGIC EVIDENCE OF BILATERAL MEDIAN NERVE ENTRAPMENT RESIDUA AT THE CARPAL SEGMENTS, THOUGH ALSO POSSIBLY INVOLVING ULNAR DISTRIBUTION DISTAL SENSORY CHANGES AS NOTED. NO MYOPATHY OR MYOTONIA. NO FASCICULATIONS. CONCLUSION:   Compatible with bilateral carpal tunnel syndrome residual postoperative and chronic. The slight changes at the ulnar are equivocal at this point. Procedure Details: This correlates with clinical history.   Because of generalized stocking glove distribution sensory loss other neuropathy such as a general sensory neuropathy might be present. Patient is made fully aware. Please see report of EMG. Please Note[de-identified]     Data and waveforms * filed under Procedure category [facility]. See Procedure Files for complete data pages. Lisa Cadet MD  Consultative Neurology, Neurodiagnostics   [facility]    [address] [place] [address]  Phone:  [phone]  Fax:   [phone]          + + +   Glossary:   MUP: motor unit potential;  SNAP: sensory nerve action potential; Fibs:  Fibrillations; Fascic: fasciculations; IA: insertional activity;  IP: interference pattern;  SCV :  Sensory conduction velocity;  MCV: motor conduction velocity; NOTE[de-identified] muscles are abbreviated latin initials. Max raw datafile[de-identified]   * filed at Procedure or [employer].  *

## 2022-05-24 NOTE — PROGRESS NOTES
NEUROLOGY  RETURN  OFFICE VISIT [de-identified]                     2022  Fercho Burk is a 59 y.o. female here for [de-identified]        Referred by Alka Mott MD     Chief Complaint:   No chief complaint on file. HPI      Active Problems:    * No active hospital problems. *  Resolved Problems:    * No resolved hospital problems. *    Past Medical History:   Diagnosis Date    Arthritis     CAD (coronary artery disease)     one stent    Former cigarette smoker     GERD (gastroesophageal reflux disease)     controlled with med     Hypertension     on med for control     Morbid obesity ([facility] [address]     BMI=38.0    Other chest pain 2017    Palpitations 2017    Psychiatric disorder     On cymbalta for depression     PUD (peptic ulcer disease)     hx; no current problems    Sleep apnea     recently diagnosed; instructed to bring c-pap dos if received by then; on 20 pt reports not using c-pap    Thyroid disease     hypo     Past Surgical History:   Procedure Laterality Date    CARDIAC CATHETERIZATION  2013    LM:nl,pLAD:30% stenosis,LCX:mild irregularities. .RCA:dominant vessel with patent long stented segment. 40-50% ostial stenosis with FFR=0.89. LV:nl EF.     CATARACT REMOVAL      bilateral     SECTION  ,     CORONARY ANGIOPLASTY WITH STENT PLACEMENT  2012     3.5 x 32 Ion CHARMAINE to proximal RCA by Dr Jose Meneses      with bladder tac    ORTHOPEDIC SURGERY      foot surgery    ORTHOPEDIC SURGERY      bilateral carpal tunnel    ORTHOPEDIC SURGERY      Right shoulder surgery      Family History   Problem Relation Age of Onset    Stroke Neg Hx     Lung Disease Neg Hx     Thyroid Disease Sister     Thyroid Disease Brother     No Known Problems Mother     Cancer Sister     Heart Disease Father     Diabetes Neg Hx      Social History     Socioeconomic History    Marital status:      Spouse name: Not on file  Number of children: Not on file    Years of education: Not on file    Highest education level: Not on file   Occupational History    Not on file   Tobacco Use    Smoking status: Former Smoker     Packs/day: 1.00     Quit date: 2012     Years since quittin.4    Smokeless tobacco: Never Used   Substance and Sexual Activity    Alcohol use: No    Drug use: No    Sexual activity: Not on file   Other Topics Concern    Not on file   Social History Narrative    Not on file     Social Determinants of Health     Financial Resource Strain:     Difficulty of Paying Living Expenses: Not on file   Food Insecurity:     Worried About Running Out of Food in the Last Year: Not on file    Dean of Food in the Last Year: Not on file   Transportation Needs:     Lack of Transportation (Medical): Not on file    Lack of Transportation (Non-Medical):  Not on file   Physical Activity:     Days of Exercise per Week: Not on file    Minutes of Exercise per Session: Not on file   Stress:     Feeling of Stress : Not on file   Social Connections:     Frequency of Communication with Friends and Family: Not on file    Frequency of Social Gatherings with Friends and Family: Not on file    Attends [employer] Services: Not on file    Active Member of [address] [place] [address] [employer] or Organizations: Not on file    Attends Club or Organization Meetings: Not on file    Marital Status: Not on file   Intimate Partner Violence:     Fear of Current or Ex-Partner: Not on file    Emotionally Abused: Not on file    Physically Abused: Not on file    Sexually Abused: Not on file   Housing Stability:     Unable to Pay for Housing in the Last Year: Not on file    Number of [place] in the Last Year: Not on file    Unstable Housing in the Last Year: Not on file        Current Outpatient Medications   Medication Sig Dispense Refill    ascorbic acid (VITAMIN C) 500 MG tablet Take 1,000 mg by mouth daily      aspirin 81 MG EC tablet Take 81 mg by mouth      Biotin 10 MG CAPS Take 1 capsule by mouth daily      celecoxib (CELEBREX) 200 MG capsule Take by mouth      vitamin D (CHOLECALCIFEROL) 25 MCG (1000 UT) TABS tablet Take 2,000 Units by mouth daily      cyanocobalamin 1000 MCG tablet Take 1,000 mcg by mouth daily      DULoxetine (CYMBALTA) 30 MG extended release capsule Take 30 mg by mouth      ezetimibe (ZETIA) 10 MG tablet Take 10 mg by mouth      hydroCHLOROthiazide (HYDRODIURIL) 25 MG tablet Take 25 mg by mouth daily      levothyroxine (SYNTHROID) 150 MCG tablet Take 150 mcg by mouth every morning (before breakfast)      losartan (COZAAR) 100 MG tablet TAKE 1 TABLET BY MOUTH EVERY DAY      nitroGLYCERIN (NITROSTAT) 0.4 MG SL tablet Place 0.4 mg under the tongue      raNITIdine (ZANTAC) 150 MG tablet Take 150 mg by mouth       No current facility-administered medications for this visit. Allergies   Allergen Reactions    Codeine Itching    Nitrofurantoin Itching    Tramadol Itching       REVIEW OTHER RECORDS:      Review of Systems     REVIEW IMAGING:     Objective: There were no vitals filed for this visit. Physical Exam       Assessment & Plan     There are no diagnoses linked to this encounter. All drugs and rx reviewed fully with patient and acknowledged. Differential diagnostic decisions and thoughts reviewed and discussed. Extensive time[de-identified]  Total time  *** minutes -     More than 50% of this visit was spent in counseling and care coordination. Treatment options fully reviewed with patient. Time includes pre-  and post- face-face time in records review, and preparation including available pertinent images and reports. Acknowledgements obtained where needed.    [ *NOTE: parts of this note were produced using artificial speech recognition software, which may have inadvertent errors in the produced wordings. ]          Alicia Billings MD  Consultative Neurology, Neurodiagnostics [facility]  [place], [address] [place] [address]  Phone:  [phone]  Fax:   [phone]        Signed By: Kinga Velazquez MD     May 24, 2022

## 2022-05-24 NOTE — PROGRESS NOTES
2022  Detra Landau     Patient is referred by the following provider for consultation regarding as below: Hand pain problems. Dear  Referring = Dr. Jenna Campos = hand center. Carpal tunnel syndromes and releases in the past.    PCP = Dr. Anel Isbell                   Chief Complaint:  Paresthesias. Hands. Painful hands and joints of the hands. Previous carpal tunnel release operations twice bilateral.      HPI  Right handed 59 y.o.     female with hand pain and paresthesias. some stumbling. * I reviewed the available and pertinent records - including eHR and Care Everywhere - notes of PMHx, PSHx, Fam Hx, and  and have examined patient with the following findings:       IMAGING REVIEW:  I REVIEWED PERTINENT  IMAGES AND REPORTS WITH THE PATIENT PERSONALLY, DIRECTLY AND FULLY. 17 extra  MINUTES. Past Medical History:  Past Medical History:   Diagnosis Date    Arthritis     CAD (coronary artery disease)     one stent    Former cigarette smoker     GERD (gastroesophageal reflux disease)     controlled with med     Hypertension     on med for control     Morbid obesity ([facility] [address]     BMI=38.0    Other chest pain 2017    Palpitations 2017    Psychiatric disorder     On cymbalta for depression     PUD (peptic ulcer disease)     hx; no current problems    Sleep apnea     recently diagnosed; instructed to bring c-pap dos if received by then; on 20 pt reports not using c-pap    Thyroid disease     hypo       Past Surgical History:  Past Surgical History:   Procedure Laterality Date    CARDIAC CATHETERIZATION  2013    LM:nl,pLAD:30% stenosis,LCX:mild irregularities. .RCA:dominant vessel with patent long stented segment. 40-50% ostial stenosis with FFR=0.89. LV:nl EF.     CATARACT REMOVAL      bilateral     SECTION  ,     CORONARY ANGIOPLASTY WITH STENT PLACEMENT  2012     3.5 x 32 Ion CHARMAINE to proximal RCA by Dr Jermaine Donahue      with bladder tac    ORTHOPEDIC SURGERY      foot surgery    ORTHOPEDIC SURGERY      bilateral carpal tunnel    ORTHOPEDIC SURGERY      Right shoulder surgery       Social History:  Social History     Socioeconomic History    Marital status:      Spouse name: Not on file    Number of children: Not on file    Years of education: Not on file    Highest education level: Not on file   Occupational History    Not on file   Tobacco Use    Smoking status: Former Smoker     Packs/day: 1.00     Quit date: 2012     Years since quittin.4    Smokeless tobacco: Never Used   Substance and Sexual Activity    Alcohol use: No    Drug use: No    Sexual activity: Not on file   Other Topics Concern    Not on file   Social History Narrative    Not on file     Social Determinants of Health     Financial Resource Strain:     Difficulty of Paying Living Expenses: Not on file   Food Insecurity:     Worried About Running Out of Food in the Last Year: Not on file    Dean of Food in the Last Year: Not on file   Transportation Needs:     Lack of Transportation (Medical): Not on file    Lack of Transportation (Non-Medical):  Not on file   Physical Activity:     Days of Exercise per Week: Not on file    Minutes of Exercise per Session: Not on file   Stress:     Feeling of Stress : Not on file   Social Connections:     Frequency of Communication with Friends and Family: Not on file    Frequency of Social Gatherings with Friends and Family: Not on file    Attends Congregation Services: Not on file    Active Member of Clubs or Organizations: Not on file    Attends Club or Organization Meetings: Not on file    Marital Status: Not on file   Intimate Partner Violence:     Fear of Current or Ex-Partner: Not on file    Emotionally Abused: Not on file    Physically Abused: Not on file    Sexually Abused: Not on file   Housing Stability:     Unable to Pay for Housing in the Last Year: Not on file    Number of Places Lived in the Last Year: Not on file    Unstable Housing in the Last Year: Not on file       Family History:   Family History   Problem Relation Age of Onset    Stroke Neg Hx     Lung Disease Neg Hx     Thyroid Disease Sister     Thyroid Disease Brother     No Known Problems Mother     Cancer Sister     Heart Disease Father     Diabetes Neg Hx        Medications:      Current Outpatient Medications:     ascorbic acid (VITAMIN C) 500 MG tablet, Take 1,000 mg by mouth daily, Disp: , Rfl:     aspirin 81 MG EC tablet, Take 81 mg by mouth, Disp: , Rfl:     Biotin 10 MG CAPS, Take 1 capsule by mouth daily, Disp: , Rfl:     celecoxib (CELEBREX) 200 MG capsule, Take by mouth, Disp: , Rfl:     vitamin D (CHOLECALCIFEROL) 25 MCG (1000 UT) TABS tablet, Take 2,000 Units by mouth daily, Disp: , Rfl:     cyanocobalamin 1000 MCG tablet, Take 1,000 mcg by mouth daily, Disp: , Rfl:     DULoxetine (CYMBALTA) 30 MG extended release capsule, Take 30 mg by mouth, Disp: , Rfl:     ezetimibe (ZETIA) 10 MG tablet, Take 10 mg by mouth, Disp: , Rfl:     hydroCHLOROthiazide (HYDRODIURIL) 25 MG tablet, Take 25 mg by mouth daily, Disp: , Rfl:     levothyroxine (SYNTHROID) 150 MCG tablet, Take 150 mcg by mouth every morning (before breakfast), Disp: , Rfl:     losartan (COZAAR) 100 MG tablet, TAKE 1 TABLET BY MOUTH EVERY DAY, Disp: , Rfl:     nitroGLYCERIN (NITROSTAT) 0.4 MG SL tablet, Place 0.4 mg under the tongue, Disp: , Rfl:     raNITIdine (ZANTAC) 150 MG tablet, Take 150 mg by mouth, Disp: , Rfl:       Allergies   Allergen Reactions    Codeine Itching    Nitrofurantoin Itching    Tramadol Itching       Review of Systems:  Review of Systems   Constitutional: Negative. HENT: Negative. Respiratory: Negative. Gastrointestinal: Negative. Musculoskeletal: Negative.     Neurological: Positive for dizziness (legs / feet = sensory changes paresthesia) and numbness. Negative for tremors, seizures, syncope, facial asymmetry, speech difficulty, weakness, light-headedness and headaches. Psychiatric/Behavioral: Negative. All other systems reviewed and are negative. No flowsheet data found. No flowsheet data found. Extended / Orthostatic Vitals:      Vitals:    05/24/22 0953   BP: (!) 173/119   Site: Left Upper Arm   Pulse: 61   Weight: 271 lb (122.9 kg)        Physical Exam  Vitals reviewed. Constitutional:       General: She is awake. She is not in acute distress. Appearance: She is well-developed and well-groomed. She is obese. She is not ill-appearing, toxic-appearing or diaphoretic. HENT:      Head: Normocephalic and atraumatic. No raccoon eyes, abrasion, contusion, right periorbital erythema or left periorbital erythema. Right Ear: Hearing normal.      Left Ear: Hearing normal.      Ears:      Comments: No glabellar. Eyes:      General: Lids are normal. Vision grossly intact. No visual field deficit or scleral icterus. Right eye: No discharge. Left eye: No discharge. Extraocular Movements: Extraocular movements intact. Right eye: Normal extraocular motion and no nystagmus. Left eye: Normal extraocular motion and no nystagmus. Conjunctiva/sclera: Conjunctivae normal.      Right eye: Right conjunctiva is not injected. Left eye: Left conjunctiva is not injected. Pupils: Pupils are equal, round, and reactive to light. Comments: PERRLA. No Neelam sign. Neck:      Trachea: Phonation normal.        Comments: Spurling sign. No Lhermitte sign. Previous neck surgery re: goiter. Pulmonary:      Effort: Pulmonary effort is normal. No respiratory distress. Breath sounds: No wheezing. Musculoskeletal:         General: No swelling, tenderness, deformity or signs of injury. Normal range of motion. Cervical back: Normal range of motion.  No rigidity or torticollis. Normal range of motion. Right lower leg: No edema. Left lower leg: No edema. Skin:     General: Skin is warm and dry. Capillary Refill: Capillary refill takes less than 2 seconds. Coloration: Skin is not cyanotic, jaundiced or pale. Nails: There is no clubbing. Comments: Nails normal.  No Mees lines. Neurological:      Mental Status: She is alert and easily aroused. Mental status is at baseline. Cranial Nerves: No cranial nerve deficit. Sensory: Sensory deficit present. Motor: No weakness, tremor, atrophy, abnormal muscle tone or seizure activity. Coordination: Romberg sign positive. Coordination abnormal. Romberg Test abnormal.      Gait: Gait abnormal and tandem walk abnormal.      Deep Tendon Reflexes: Reflexes are normal and symmetric. Reflexes normal.      Comments: DTRs are all 2+. However she does have stocking as well as glove distribution pinprick decrease and vibration loss of the toes. 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 Equivocal Tinel's sign or Phalen's. Tender at the proximal thumb joints. Mild gait disorder. Psychiatric:         Attention and Perception: Attention normal.         Mood and Affect: Mood normal.         Speech: Speech normal.         Behavior: Behavior normal. Behavior is cooperative. Neurologic Exam     Mental Status   Attention: normal. Concentration: normal.   Speech: speech is normal   Level of consciousness: alert  Knowledge: good and consistent with education. Normal comprehension. Cranial Nerves   Cranial nerves II through XII intact. CN III, IV, VI   Pupils are equal, round, and reactive to light. Motor Exam   Muscle bulk: normal  Overall muscle tone: normalThere is no tic, twitch, tonic or clonic activity noted. Motor = no abnormal movements. No fasciculations or fibrillations (tongue). No myotonia or myokymia.            No tardive dyskinesia. No myoclonus. No clonus. No fascic. Sensory Exam   Right arm light touch: decreased from fingers  Left arm light touch: decreased from fingers  Right leg light touch: decreased from ankle  Left leg light touch: decreased from ankle  Right leg vibration: decreased from knee  Left leg vibration: decreased from knee  Right arm pinprick: decreased from fingers  Left arm pinprick: decreased from fingers  Right leg pinprick: decreased from ankle  Left leg pinprick: decreased from ankle    Gait, Coordination, and Reflexes     Gait  Gait: (Mild/slight gait disturbance.)    Coordination   Romberg: positive  Tandem walking coordination: abnormal    Tremor   Resting tremor: absent  Intention tremor: absent  Action tremor: absent    Reflexes   Right plantar: normal  Left plantar: normal  Right Swanson: absent  Left Swanson: absentMildly positive Romberg. Tandem gait abnormal.   There is no tic, twitch, tonic or clonic activity noted. Assessment   Assessment / Plan:    Diagnoses and all orders for this visit:    Numbness and tingling in both hands  -     Motor &/sens 13/> nerve conduction test  -     Needle EMG w/ nerve conduction test, limited    Carpal tunnel syndrome, bilateral  -     Motor &/sens 13/> nerve conduction test  -     Needle EMG w/ nerve conduction test, limited    Pain of right thumb  -     Motor &/sens 13/> nerve conduction test  -     Needle EMG w/ nerve conduction test, limited    Paresthesia of both lower extremities    Gait disorder    1. BILATERAL carpal tunnel - likely residual - affecting sensory segments only. 2.  Hand / arthritic pains at joints. 3.  Stumble and fall w paresthesia and numbness = = neuropathy is present it appears to be mostly sensory however but symmetric and worse at actually the toes and feet at the present time. She will return for EMG/NCV of the lower extremities.     As to the paresthesia diffuse and at lowers = return for EMG NCV

## 2022-07-12 ENCOUNTER — OFFICE VISIT (OUTPATIENT)
Dept: NEUROLOGY | Age: 62
End: 2022-07-12
Payer: COMMERCIAL

## 2022-07-12 VITALS
BODY MASS INDEX: 42.75 KG/M2 | DIASTOLIC BLOOD PRESSURE: 98 MMHG | HEART RATE: 62 BPM | SYSTOLIC BLOOD PRESSURE: 160 MMHG | HEIGHT: 66 IN | WEIGHT: 266 LBS

## 2022-07-12 DIAGNOSIS — Z79.899 ENCOUNTER FOR MEDICATION MANAGEMENT: ICD-10-CM

## 2022-07-12 DIAGNOSIS — M54.2 CERVICALGIA: ICD-10-CM

## 2022-07-12 DIAGNOSIS — G57.11 MERALGIA PARAESTHETICA, RIGHT: ICD-10-CM

## 2022-07-12 DIAGNOSIS — G62.9 SENSORY NEUROPATHY: ICD-10-CM

## 2022-07-12 DIAGNOSIS — R20.2 PARESTHESIA OF BOTH LOWER EXTREMITIES: Primary | ICD-10-CM

## 2022-07-12 PROBLEM — R26.9 GAIT DISORDER: Status: RESOLVED | Noted: 2022-05-24 | Resolved: 2022-07-12

## 2022-07-12 PROCEDURE — 99214 OFFICE O/P EST MOD 30 MIN: CPT | Performed by: PSYCHIATRY & NEUROLOGY

## 2022-07-12 PROCEDURE — 95885 MUSC TST DONE W/NERV TST LIM: CPT | Performed by: PSYCHIATRY & NEUROLOGY

## 2022-07-12 PROCEDURE — 95910 NRV CNDJ TEST 7-8 STUDIES: CPT | Performed by: PSYCHIATRY & NEUROLOGY

## 2022-07-12 ASSESSMENT — VISUAL ACUITY: OU: 1

## 2022-07-12 ASSESSMENT — ENCOUNTER SYMPTOMS: EYES NEGATIVE: 1

## 2022-07-12 NOTE — PATIENT INSTRUCTIONS
Patient Education        Meralgia Paresthetica: Care Instructions  Overview  Meralgia paresthetica (say \"muh-RAL-juh [id number]-oim-BXTY-ick-uh\") is pain and numbness in the outer part of your thigh. The pain might get worse after youwalk or stand for a long time. This pain and numbness occur when a nerve in your thigh is pinched (compressed). Sometimes the problem is caused by wearing tight clothing orbeing overweight. Most of the time the problem goes away on its own in a few months. Lowering any pressure on the thigh area may help. Wear loose clothes, and lose weight if youneed to. Follow-up care is a key part of your treatment and safety. Be sure to make and go to all appointments, and call your doctor if you are having problems. It's also a good idea to know your test results and keep alist of the medicines you take. How can you care for yourself at home?  Most times the problem gets better on its own. Try wearing loose clothing to see if this helps.  Lose weight if you need to. Talk with your doctor if you need help. When should you call for help? Watch closely for changes in your health, and be sure to contact your doctor if:     You have new symptoms, such as pain that gets worse or new numbness in your thigh.      You do not get better as expected. Where can you learn more? Go to [email or web address]. org and sign in to your [employer] account. Enter X631 in the [facility] box to learn more about \"Meralgia Paresthetica: Care Instructions. \"     If you do not have an account, please click on the \"Sign Up Now\" link. Current as of: December 13, 2021               Content Version: 13.3  © 0310-4893 Healthwise, Incorporated. Care instructions adapted under license by [facility] ([facility]).  If you have questions about a medical condition or this instruction, always ask your healthcare professional. [address]  any warranty or liability for your use of this information. Patient Education        Neck: Exercises  Introduction  Here are some examples of exercises for you to try. The exercises may be suggested for a condition or for rehabilitation. Start each exercise slowly. Ease off the exercises if you start to have pain. You will be told when to start these exercises and which ones will work bestfor you. How to do the exercises  Neck stretch    1. This stretch works best if you keep your shoulder down as you lean away from it. To help you remember to do this, start by relaxing your shoulders and lightly holding on to your thighs or your chair. 2. Tilt your head toward your shoulder and hold for 15 to 30 seconds. Let the weight of your head stretch your muscles. 3. If you would like a little added stretch, use your hand to gently and steadily pull your head toward your shoulder. For example, keeping your right shoulder down, lean your head to the left. 4. Repeat 2 to 4 times toward each shoulder. Diagonal neck stretch    1. Turn your head slightly toward the direction you will be stretching, and tilt your head diagonally toward your chest and hold for 15 to 30 seconds. 2. If you would like a little added stretch, use your hand to gently and steadily pull your head forward on the diagonal.  3. Repeat 2 to 4 times toward each side. Dorsal glide stretch    The dorsal glide stretches the back of the neck. If you feel pain, do not glide so far back. Some people find this exercise easier to do while lying on theirbacks with an ice pack on the neck. 1. Sit or stand tall and look straight ahead. 2. Slowly tuck your chin as you glide your head backward over your body  3. Hold for a count of 6, and then relax for up to 10 seconds. 4. Repeat 8 to 12 times. Chest and shoulder stretch    1. Sit or stand tall and glide your head backward as in the dorsal glide stretch. 2. Raise both arms so that your hands are next to your ears.   3. Take a deep breath, and as you breathe out, lower your elbows down and behind your back. You will feel your shoulder blades slide down and together, and at the same time you will feel a stretch across your chest and the front of your shoulders. 4. Hold for about 6 seconds, and then relax for up to 10 seconds. 5. Repeat 8 to 12 times. Strengthening: Hands on head    1. Move your head backward, forward, and side to side against gentle pressure from your hands, holding each position for about 6 seconds. 2. Repeat 8 to 12 times. Follow-up care is a key part of your treatment and safety. Be sure to make and go to all appointments, and call your doctor if you are having problems. It's also a good idea to know your test results and keep alist of the medicines you take. Where can you learn more? Go to https://[employer].Trustifi. org and sign in to your [employer] account. Enter P975 in the [employer] box to learn more about \"Neck: Exercises. \"     If you do not have an account, please click on the \"Sign Up Now\" link. Current as of: March 9, 2022               Content Version: 13.3  © 0936-5665 Healthwise, Incorporated. Care instructions adapted under license by [facility] ([facility]). If you have questions about a medical condition or this instruction, always ask your healthcare professional. [address] any warranty or liability for your use of this information.

## 2022-07-12 NOTE — PROGRESS NOTES
NEUROLOGY  RETURN  OFFICE VISIT [de-identified]                     2022  Kenrick Gunter is a 59 y.o. female here for [de-identified] Lower extremity paresthesias for EMG today lower extremities. Referred by     Clint Currie MD     Chief Complaint:   Paresthesias. Thigh pain and paresthesia lateral.  Meralgia paresthetica. 68-year-old right-handed white female with lower extremity abnormalities at the thigh mainly on the right but also a history of sensory disturbance lower extremities. Active Problems:    * No active hospital problems. *  Resolved Problems:    * No resolved hospital problems. *    Past Medical History:   Diagnosis Date    Arthritis     CAD (coronary artery disease)     one stent    Former cigarette smoker     GERD (gastroesophageal reflux disease)     controlled with med     Hypertension     on med for control     Morbid obesity ([facility] [address]     BMI=38.0    Other chest pain 2017    Palpitations 2017    Psychiatric disorder     On cymbalta for depression     PUD (peptic ulcer disease)     hx; no current problems    Sleep apnea     recently diagnosed; instructed to bring c-pap dos if received by then; on 20 pt reports not using c-pap    Thyroid disease     hypo     Past Surgical History:   Procedure Laterality Date    CARDIAC CATHETERIZATION  2013    LM:nl,pLAD:30% stenosis,LCX:mild irregularities. .RCA:dominant vessel with patent long stented segment. 40-50% ostial stenosis with FFR=0.89. LV:nl EF.     CATARACT REMOVAL      bilateral     SECTION  ,     CORONARY ANGIOPLASTY WITH STENT PLACEMENT  2012     3.5 x 32 Ion CHARMAINE to proximal RCA by Dr Jose Bonilla      with bladder tac    ORTHOPEDIC SURGERY      foot surgery    ORTHOPEDIC SURGERY      bilateral carpal tunnel    ORTHOPEDIC SURGERY      Right shoulder surgery      Family History   Problem Relation Age of Onset    Stroke Neg Hx  Lung Disease Neg Hx     Thyroid Disease Sister     Thyroid Disease Brother     No Known Problems Mother     Cancer Sister     Heart Disease Father     Diabetes Neg Hx      Social History     Socioeconomic History    Marital status:      Spouse name: Not on file    Number of children: Not on file    Years of education: Not on file    Highest education level: Not on file   Occupational History    Not on file   Tobacco Use    Smoking status: Former Smoker     Packs/day: 1.00     Quit date: 2012     Years since quittin.5    Smokeless tobacco: Never Used   Substance and Sexual Activity    Alcohol use: No    Drug use: No    Sexual activity: Not on file   Other Topics Concern    Not on file   Social History Narrative    Not on file     Social Determinants of Health     Financial Resource Strain:     Difficulty of Paying Living Expenses: Not on file   Food Insecurity:     Worried About Running Out of Food in the Last Year: Not on file    Dean of Food in the Last Year: Not on file   Transportation Needs:     Lack of Transportation (Medical): Not on file    Lack of Transportation (Non-Medical):  Not on file   Physical Activity:     Days of Exercise per Week: Not on file    Minutes of Exercise per Session: Not on file   Stress:     Feeling of Stress : Not on file   Social Connections:     Frequency of Communication with Friends and Family: Not on file    Frequency of Social Gatherings with Friends and Family: Not on file    Attends [employer] Services: Not on file    Active Member of Clubs or Organizations: Not on file    Attends Club or Organization Meetings: Not on file    Marital Status: Not on file   Intimate Partner Violence:     Fear of Current or Ex-Partner: Not on file    Emotionally Abused: Not on file    Physically Abused: Not on file    Sexually Abused: Not on file   Housing Stability:     Unable to Pay for Housing in the Last Year: Not on file    Number of Places Lived in the Last Year: Not on file    Unstable Housing in the Last Year: Not on file        Current Outpatient Medications   Medication Sig Dispense Refill    ascorbic acid (VITAMIN C) 500 MG tablet Take 1,000 mg by mouth daily      aspirin 81 MG EC tablet Take 81 mg by mouth      Biotin 10 MG CAPS Take 1 capsule by mouth daily      celecoxib (CELEBREX) 200 MG capsule Take by mouth      vitamin D (CHOLECALCIFEROL) 25 MCG (1000 UT) TABS tablet Take 2,000 Units by mouth daily      cyanocobalamin 1000 MCG tablet Take 1,000 mcg by mouth daily      DULoxetine (CYMBALTA) 30 MG extended release capsule Take 30 mg by mouth      ezetimibe (ZETIA) 10 MG tablet Take 10 mg by mouth      hydroCHLOROthiazide (HYDRODIURIL) 25 MG tablet Take 25 mg by mouth daily      levothyroxine (SYNTHROID) 150 MCG tablet Take 150 mcg by mouth every morning (before breakfast)      losartan (COZAAR) 100 MG tablet TAKE 1 TABLET BY MOUTH EVERY DAY      nitroGLYCERIN (NITROSTAT) 0.4 MG SL tablet Place 0.4 mg under the tongue      raNITIdine (ZANTAC) 150 MG tablet Take 150 mg by mouth       No current facility-administered medications for this visit. Allergies   Allergen Reactions    Codeine Itching    Nitrofurantoin Itching    Tramadol Itching       REVIEW OTHER RECORDS:    Review of Systems   Constitutional: Negative. HENT: Negative. Eyes: Negative. Genitourinary: Negative. Musculoskeletal: Negative. Skin: Negative. Neurological: Positive for tingling, sensory change (right thigh. lateral. ) and focal weakness (hands. ). Negative for dizziness, tremors, speech change, seizures, loss of consciousness, weakness and headaches. Psychiatric/Behavioral: Negative. All other systems reviewed and are negative. REVIEW IMAGING:   * NOTE ___>>  PT DROPPED A CD-ROM OF OUTSIDE MRI * AT [facility] ==  Cervical spine MRI ==     No report with. ...         Objective:     Vitals: 07/12/22 0955   BP: (!) 160/98   Site: Left Lower Arm   Pulse: 62   Weight: 266 lb (120.7 kg)   Height: 5' 6\" (1.676 m)        Physical Exam  Vitals reviewed. Constitutional:       General: She is awake. She is not in acute distress. Appearance: She is well-developed and well-groomed. She is obese. She is not ill-appearing, toxic-appearing or diaphoretic. HENT:      Head: Normocephalic and atraumatic. No raccoon eyes, abrasion, contusion, right periorbital erythema or left periorbital erythema. Right Ear: Hearing normal.      Left Ear: Hearing normal.   Eyes:      General: Lids are normal. Vision grossly intact. No scleral icterus. Right eye: No discharge. Left eye: No discharge. Extraocular Movements: Extraocular movements intact. Conjunctiva/sclera: Conjunctivae normal.      Pupils: Pupils are equal, round, and reactive to light. Neck:      Trachea: Phonation normal.   Pulmonary:      Effort: Pulmonary effort is normal. No respiratory distress. Breath sounds: No wheezing. Musculoskeletal:         General: No swelling, tenderness, deformity or signs of injury. Normal range of motion. Cervical back: Normal range of motion. No torticollis. Right lower leg: No edema. Left lower leg: No edema. Skin:     General: Skin is warm and dry. Coloration: Skin is not cyanotic, jaundiced or pale. Nails: There is no clubbing. Neurological:      Mental Status: She is alert and easily aroused. Mental status is at baseline. Cranial Nerves: Cranial nerves are intact. No cranial nerve deficit, dysarthria or facial asymmetry. Sensory: Sensory deficit (Thigh = meralgia paresthetica = right.) present. Motor: No weakness, tremor, atrophy, abnormal muscle tone or seizure activity. Coordination: Coordination is intact. Coordination normal.      Gait: Gait is intact. Gait normal.      Comments: No fasciculations. No atrophy.   No dyskinesia or abnormal movements. Psychiatric:         Attention and Perception: Attention normal.         Mood and Affect: Mood normal.         Speech: Speech normal.         Behavior: Behavior normal. Behavior is cooperative. Neurologic Exam     Mental Status   Speech: speech is normal   Level of consciousness: alert  Knowledge: good and consistent with education. Normal comprehension. Cranial Nerves     CN III, IV, VI   Pupils are equal, round, and reactive to light. Gait, Coordination, and Reflexes     Gait  Gait: normal    Tremor   Resting tremor: absent  Intention tremor: absent  Action tremor: absent  There is no tic, twitch, tonic or clonic activity noted. No dyskinesia. Assessment & Plan     Diagnoses and all orders for this visit:    Paresthesia of both lower extremities  -     Motor &/sens 7-8 nerve conduction test  -     Needle EMG w/ nerve conduction test, limited    Meralgia paraesthetica, right  -     Motor &/sens 7-8 nerve conduction test  -     Needle EMG w/ nerve conduction test, limited    Cervicalgia  -     Motor &/sens 7-8 nerve conduction test  -     Needle EMG w/ nerve conduction test, limited    Encounter for medication management  -     Motor &/sens 7-8 nerve conduction test  -     Needle EMG w/ nerve conduction test, limited    Sensory neuropathy  -     Motor &/sens 7-8 nerve conduction test  -     Needle EMG w/ nerve conduction test, limited    1. EMG needle == neg sampled RLE mm.   2.  NCV == see report please. No definitive neuropathy identified. 3.   OTHER = =  CD-ROM outside c-spine MRI = =             An outside MRI CD-ROM at *[facility]* c-spine = without a report. Images reviewed. Partial interpretation = (formal deferred to the interpreting radiologist ) - I did not identify a specific abnormality to discuss. All drugs and rx reviewed fully with patient and acknowledged specific to neurology as well.             Differential diagnostic decisions and thoughts reviewed and discussed. Updating to patient identification, coordinate neurology visits, reasons for follow, review neurologic problems. Extensive time[de-identified]  Total time 30 minutes -   Extra to EMG. More than 50% of this visit was spent in counseling and care coordination. Treatment options fully reviewed with patient. Time includes pre-  and post- face-face time in records review, and preparation including available pertinent images and reports. Acknowledgements obtained where needed.    [ *NOTE: parts of this note were produced using artificial speech recognition software, which may have inadvertent errors in the produced wordings. ]          Ronda Sargent MD  Consultative Neurology, [address] [facility] [address] [place] [address]  Phone:  [phone]  Fax:   [phone]        Signed By: Ronda Sargent MD     July 12, 2022

## 2022-07-12 NOTE — PROGRESS NOTES
EMG/Nerve Conduction Study Procedure Note  [address] [facility], [facility] Terry Maharaj   [phone]      Hx:    Exam:     59 y.o. RH female returning today for EMG/NCV of the lower extremities for abnormal gait, stocking as well as glove distribution pinprick decr. , vib loss of toes on clinical exam, r/o polyneuropathy. Symptoms of meralgia paresthetica RIGHT side. No hx of DM. Summary               Needle EMG of selected mm of the right lower extremity. .. 1.      Controlled environmental factors / EMG lab. Temperature. 2. NCV : sensory segments:    Abnormal sural SCV == timing CV is normal however -- there is significant reduction/attenuation of the Sural SNAP amplitudes. 3. NCV plantar sensory segments:  Deferred. 4. NCV Motor MCV segments:     Normal bilateral peroneal and tibial MCV CMAP. 5. F-wave studies:   Normal bilateral peroneal and tibial F-waves. 6. H-REFLEX Studies:    Normal bilateral H-reflexes. 7.  NEEDLE EMG:   Tested muscles[de-identified]          INTERPRETATION:    THERE IS NO DEFINITIVE ELECTROPHYSIOLOGIC EVIDENCE OF SIGNIFICANT DENERVATION, NEUROPATHY, RADICULOPATHY IN TESTED SEGMENTS. THE SURAL NERVE STUDIES SUGGEST A AXONAL AMPLITUDE CHANGE OF THE SENSORY NERVES, BUT THIS IS EQUIVOCAL. NO MYOPATHY OR MYOTONIA. NO EVIDENCE OF RADICULOPATHY. CONCLUSION:   These findings are only borderline of the sural nerves suggesting possible early axonal neuropathy. However this is equivocal.  No definitive neuropathy identified. Procedure Details: Only suggestive evidence. As far as the meralgia paresthetica, technical morphologic factors precede being able to actually have an adequate electrophysiologic testing of the lateral femoral cutaneous nerve. Patient made fully aware. Acknowledged. Please Note[de-identified]     Data and waveforms * filed under Procedure category [facility].     See Procedure Files for complete data pages.                Johanne Briggs MD  Consultative Neurology, Neurodiagnostics   [facility]    [address] [place] [address], [address] [place] [address]  Phone:  [phone]  Fax:   [phone]          + + +   Glossary:   MUP: motor unit potential;  SNAP: sensory nerve action potential; Fibs:  Fibrillations; Fascic: fasciculations; IA: insertional activity;  IP: interference pattern;  SCV :  Sensory conduction velocity;  MCV: motor conduction velocity; NOTE[de-identified] muscles are abbreviated latin initials. Nicolet raw datafile[de-identified]   * filed at Procedure or [employer].  *

## 2023-09-28 ENCOUNTER — INITIAL CONSULT (OUTPATIENT)
Age: 63
End: 2023-09-28

## 2023-09-28 VITALS
BODY MASS INDEX: 43.07 KG/M2 | DIASTOLIC BLOOD PRESSURE: 80 MMHG | WEIGHT: 268 LBS | HEIGHT: 66 IN | SYSTOLIC BLOOD PRESSURE: 136 MMHG | HEART RATE: 68 BPM

## 2023-09-28 DIAGNOSIS — I10 ACCELERATED HYPERTENSION: Primary | ICD-10-CM

## 2023-09-28 DIAGNOSIS — R06.09 DOE (DYSPNEA ON EXERTION): ICD-10-CM

## 2023-09-28 DIAGNOSIS — R00.2 PALPITATIONS: ICD-10-CM

## 2023-09-28 DIAGNOSIS — I25.10 ATHEROSCLEROSIS OF NATIVE CORONARY ARTERY OF NATIVE HEART WITHOUT ANGINA PECTORIS: ICD-10-CM

## 2023-09-28 DIAGNOSIS — Z01.810 PREOP CARDIOVASCULAR EXAM: ICD-10-CM

## 2023-09-28 RX ORDER — FAMOTIDINE 10 MG
10 TABLET ORAL 2 TIMES DAILY
COMMUNITY

## 2023-09-28 ASSESSMENT — ENCOUNTER SYMPTOMS
SHORTNESS OF BREATH: 0
BOWEL INCONTINENCE: 0
CHEST TIGHTNESS: 0
SPUTUM PRODUCTION: 0
HOARSE VOICE: 0
WHEEZING: 0
COLOR CHANGE: 0
DIARRHEA: 0
HEMATOCHEZIA: 0
HEMATEMESIS: 0
ABDOMINAL PAIN: 0
BLURRED VISION: 0
ORTHOPNEA: 0

## 2023-09-28 NOTE — PROGRESS NOTES
[facility] CARDIOLOGY  [address] [facility], [address]  PHONE: [phone]        23        NAME:  May Grady  : 1960  MRN: [id number]       SUBJECTIVE:   May Grady is a 70 y.o. female seen for a follow up visit regarding the following: The patient has a distant hx of CAD with a RCA stent in . She was last seen in 2019. She is referred back by her PCP for cardiac evaluation before future potential bariatric surgery. She reports chronic JUAN and easy fatigue. Chief Complaint   Patient presents with    Consultation     Pre op     Hypertension     sob       HPI:    Coronary Artery Disease  Presents for follow-up visit. Symptoms include weight gain. Pertinent negatives include no chest pain, chest pressure, chest tightness, dizziness, leg swelling, palpitations or shortness of breath. The symptoms have been stable. Past Medical History, Past Surgical History, Family history, Social History, and Medications were all reviewed with the patient today and updated as necessary.          Current Outpatient Medications:     famotidine (PEPCID) 10 MG tablet, Take 1 tablet by mouth 2 times daily, Disp: , Rfl:     celecoxib (CELEBREX) 200 MG capsule, Take by mouth, Disp: , Rfl:     ezetimibe (ZETIA) 10 MG tablet, Take 1 tablet by mouth, Disp: , Rfl:     levothyroxine (SYNTHROID) 150 MCG tablet, Take 1 tablet by mouth every morning (before breakfast), Disp: , Rfl:     ascorbic acid (VITAMIN C) 500 MG tablet, Take 1,000 mg by mouth daily (Patient not taking: Reported on 2023), Disp: , Rfl:     aspirin 81 MG EC tablet, Take 81 mg by mouth (Patient not taking: Reported on 2023), Disp: , Rfl:     Biotin 10 MG CAPS, Take 1 capsule by mouth daily (Patient not taking: Reported on 2023), Disp: , Rfl:     vitamin D (CHOLECALCIFEROL) 25 MCG (1000 UT) TABS tablet, Take 2,000 Units by mouth daily (Patient not taking: Reported on 2023), Disp: , Rfl:

## 2023-10-28 PROBLEM — Z01.810 PREOP CARDIOVASCULAR EXAM: Status: RESOLVED | Noted: 2022-07-12 | Resolved: 2023-10-28

## 2023-11-09 ENCOUNTER — OFFICE VISIT (OUTPATIENT)
Age: 63
End: 2023-11-09

## 2023-11-09 VITALS
SYSTOLIC BLOOD PRESSURE: 138 MMHG | BODY MASS INDEX: 42.15 KG/M2 | WEIGHT: 253 LBS | DIASTOLIC BLOOD PRESSURE: 76 MMHG | HEIGHT: 65 IN | HEART RATE: 66 BPM

## 2023-11-09 DIAGNOSIS — I25.10 ATHEROSCLEROSIS OF NATIVE CORONARY ARTERY OF NATIVE HEART WITHOUT ANGINA PECTORIS: Primary | ICD-10-CM

## 2023-11-09 DIAGNOSIS — R06.09 DOE (DYSPNEA ON EXERTION): ICD-10-CM

## 2023-11-09 DIAGNOSIS — Z01.810 PREOP CARDIOVASCULAR EXAM: ICD-10-CM

## 2023-11-09 ASSESSMENT — ENCOUNTER SYMPTOMS
WHEEZING: 0
HEMATOCHEZIA: 0
SHORTNESS OF BREATH: 0
CHEST TIGHTNESS: 0
ORTHOPNEA: 0
SPUTUM PRODUCTION: 0
BLURRED VISION: 0
ABDOMINAL PAIN: 0
HEMATEMESIS: 0
COLOR CHANGE: 0
DIARRHEA: 0
BOWEL INCONTINENCE: 0
HOARSE VOICE: 0

## 2023-11-09 NOTE — PROGRESS NOTES
range of motion. Cervical back: Normal range of motion and neck supple. Skin:     General: Skin is warm and dry. Neurological:      General: No focal deficit present. Mental Status: She is alert and oriented to person, place, and time. Psychiatric:         Mood and Affect: Mood normal.         Medical problems and test results were reviewed with the patient today.      Recent Results (from the past 672 hour(s))   Nuclear stress test with myocardial perfusion    Collection Time: 10/24/23  1:32 PM   Result Value Ref Range    Stress Target  bpm    Baseline Systolic  mmHg    Baseline Diastolic  mmHg    Baseline HR 64 bpm    Stress Systolic  mmHg    Stress Diastolic BP 70 mmHg    Stress Peak  bpm    Stress Rate Pressure Product 11,800 bpm*mmHg    Stress Percent HR Achieved 65 %    Body Surface Area 2.38 m2    Nuc Stress EF 53 %   Echo (TTE) complete (PRN contrast/bubble/strain/3D)    Collection Time: 11/07/23  8:24 AM   Result Value Ref Range    Body Surface Area 2.36 m2    LV EDV A2C 141 mL    LV EDV A4C 109 mL    LV ESV A2C 51 mL    LV ESV A4C 43 mL    IVSd 1.3 (A) 0.6 - 0.9 cm    LVIDd 4.9 3.9 - 5.3 cm    LVIDs 3.1 cm    LVOT Peak Velocity 1.0 m/s    LVOT Peak Gradient 4 mmHg    LVPWd 1.1 (A) 0.6 - 0.9 cm    LV E' Lateral Velocity 7 cm/s    LV E' Septal Velocity 7 cm/s    LV Ejection Fraction A2C 64 %    LV Ejection Fraction A4C 60 %    EF BP 62 55 - 100 %    LA Minor Axis 4.6 cm    LA Major Sutton 4.1 cm    LA Area 2C 16.7 cm2    LA Area 4C 13.7 cm2    LA Volume A-L A4C 49 22 - 52 mL    LA Volume A-L A4C 36 22 - 52 mL    LA Volume BP 45 22 - 52 mL    LA Diameter 3.9 cm    AV Peak Velocity 1.3 m/s    AV Peak Gradient 6 mmHg    Aortic Root 3.2 cm    MV E Wave Deceleration Time 164.0 ms    MV A Velocity 1.10 m/s    MV E Velocity 0.85 m/s    RVIDd 3.2 cm    RV Basal Dimension 3.0 cm    RV Mid Dimension 2.3 cm    TAPSE 2.3 1.7 cm    Fractional Shortening 2D 37 28 - 44 %    LV ESV

## 2023-12-09 PROBLEM — Z01.810 PREOP CARDIOVASCULAR EXAM: Status: RESOLVED | Noted: 2022-07-12 | Resolved: 2023-12-09

## 2024-06-03 ENCOUNTER — OFFICE VISIT (OUTPATIENT)
Age: 64
End: 2024-06-03
Payer: MEDICARE

## 2024-06-03 VITALS
WEIGHT: 193.4 LBS | HEART RATE: 68 BPM | HEIGHT: 65 IN | BODY MASS INDEX: 32.22 KG/M2 | DIASTOLIC BLOOD PRESSURE: 80 MMHG | SYSTOLIC BLOOD PRESSURE: 118 MMHG

## 2024-06-03 DIAGNOSIS — R00.2 PALPITATIONS: Primary | ICD-10-CM

## 2024-06-03 DIAGNOSIS — I25.10 ATHEROSCLEROSIS OF NATIVE CORONARY ARTERY OF NATIVE HEART WITHOUT ANGINA PECTORIS: ICD-10-CM

## 2024-06-03 PROCEDURE — 1090F PRES/ABSN URINE INCON ASSESS: CPT | Performed by: INTERNAL MEDICINE

## 2024-06-03 PROCEDURE — 3017F COLORECTAL CA SCREEN DOC REV: CPT | Performed by: INTERNAL MEDICINE

## 2024-06-03 PROCEDURE — 1123F ACP DISCUSS/DSCN MKR DOCD: CPT | Performed by: INTERNAL MEDICINE

## 2024-06-03 PROCEDURE — 99213 OFFICE O/P EST LOW 20 MIN: CPT | Performed by: INTERNAL MEDICINE

## 2024-06-03 PROCEDURE — 3074F SYST BP LT 130 MM HG: CPT | Performed by: INTERNAL MEDICINE

## 2024-06-03 PROCEDURE — G8400 PT W/DXA NO RESULTS DOC: HCPCS | Performed by: INTERNAL MEDICINE

## 2024-06-03 PROCEDURE — G8427 DOCREV CUR MEDS BY ELIG CLIN: HCPCS | Performed by: INTERNAL MEDICINE

## 2024-06-03 PROCEDURE — 1036F TOBACCO NON-USER: CPT | Performed by: INTERNAL MEDICINE

## 2024-06-03 PROCEDURE — 3079F DIAST BP 80-89 MM HG: CPT | Performed by: INTERNAL MEDICINE

## 2024-06-03 PROCEDURE — G8417 CALC BMI ABV UP PARAM F/U: HCPCS | Performed by: INTERNAL MEDICINE

## 2024-06-03 ASSESSMENT — ENCOUNTER SYMPTOMS
ORTHOPNEA: 0
COUGH: 0
BLURRED VISION: 0
SHORTNESS OF BREATH: 0
BOWEL INCONTINENCE: 0
DIARRHEA: 0
COLOR CHANGE: 0
HOARSE VOICE: 0
SPUTUM PRODUCTION: 0
ABDOMINAL PAIN: 0
WHEEZING: 0
VOMITING: 0
HEMATEMESIS: 0
HEMATOCHEZIA: 0
NAUSEA: 0

## 2024-06-03 NOTE — PROGRESS NOTES
and dry.   Neurological:      General: No focal deficit present.      Mental Status: She is alert and oriented to person, place, and time.   Psychiatric:         Mood and Affect: Mood normal.         Medical problems and test results were reviewed with the patient today.     No results found for this or any previous visit (from the past 672 hour(s)).  No results found for: \"CHOL\", \"CHOLPOCT\", \"CHLST\", \"CHOLV\", \"HDL\", \"HDLPOC\", \"HDLC\", \"LDL\", \"VLDLC\", \"VLDL\"  No results found for any visits on 06/03/24.    ASSESSMENT and PLAN    Antonia was seen today for coronary artery disease.    Diagnoses and all orders for this visit:    Palpitations:Worse after surgery in 2/2024.Extended holter monitor to investigate complaints and evaluate for ?arrhythmia.  -     Extended cardiac holter monitor (3 days - 15 days); Future    Atherosclerosis of native coronary artery of native heart without angina pectoris:Stable with no reported angina.Negative stress MPI scan in 10/2023 c/w low CV risk.          Disposition:    Return in about 6 weeks (around 7/15/2024) for Follow up after procedure.                MARY JEFFERSON MD  6/3/2024  1:10 PM

## 2024-08-16 ENCOUNTER — OFFICE VISIT (OUTPATIENT)
Age: 64
End: 2024-08-16

## 2024-08-16 VITALS
BODY MASS INDEX: 29.99 KG/M2 | DIASTOLIC BLOOD PRESSURE: 68 MMHG | SYSTOLIC BLOOD PRESSURE: 114 MMHG | WEIGHT: 180 LBS | HEART RATE: 76 BPM | HEIGHT: 65 IN

## 2024-08-16 DIAGNOSIS — I25.10 ATHEROSCLEROSIS OF NATIVE CORONARY ARTERY OF NATIVE HEART WITHOUT ANGINA PECTORIS: Primary | ICD-10-CM

## 2024-08-16 DIAGNOSIS — R00.2 PALPITATIONS: ICD-10-CM

## 2024-08-16 ASSESSMENT — ENCOUNTER SYMPTOMS
BLURRED VISION: 0
HOARSE VOICE: 0
BOWEL INCONTINENCE: 0
SPUTUM PRODUCTION: 0
WHEEZING: 0
HEMATEMESIS: 0
ORTHOPNEA: 0
COLOR CHANGE: 0
ABDOMINAL PAIN: 0
CHEST TIGHTNESS: 0
DIARRHEA: 0
HEMATOCHEZIA: 0
SHORTNESS OF BREATH: 0

## 2024-08-16 NOTE — PROGRESS NOTES
[facility] CARDIOLOGY  [address] [place] [address], [facility] [address]  [place] [address]  PHONE: [phone]        24        NAME:  Antonia Simmons  : 1960  MRN: [id number]       SUBJECTIVE:   Antonia Simmons is a 64 y.o. female seen for a follow up visit regarding the following: CAD and palpitations.On her last visit,she complained of palpitations.Follow up extended holter monitor revealed NSR with rare brief PSVT,PAC's,and PVC's.She returns for follow up test result discussion.I discussed monitor results with the patient.Previously,she had a normal stress MPI scan in 10/2023.    Chief Complaint   Patient presents with    Coronary Artery Disease       HPI:    Coronary Artery Disease  Presents for follow-up visit. Symptoms include palpitations (Rare and stable.). Pertinent negatives include no chest pain, chest pressure, chest tightness, dizziness, leg swelling, muscle weakness, shortness of breath or weight gain. The symptoms have been stable.       Past Medical History, Past Surgical History, Family history, Social History, and Medications were all reviewed with the patient today and updated as necessary.         Current Outpatient Medications:     famotidine (PEPCID) 10 MG tablet, Take 1 tablet by mouth 2 times daily, Disp: , Rfl:     Biotin 10 MG CAPS, Take 1 capsule by mouth daily, Disp: , Rfl:     levothyroxine (SYNTHROID) 150 MCG tablet, Take 1.5 tablets by mouth every morning (before breakfast), Disp: , Rfl:   Allergies   Allergen Reactions    Diclofenac Sodium Angioedema and Hives    Pravastatin Itching, Other (See Comments) and Swelling    Simvastatin Itching, Other (See Comments) and Swelling    Atorvastatin Hives, Itching, Other (See Comments) and Swelling    Clopidogrel Itching and Swelling    Codeine Itching    Gabapentin Itching    Nitrofurantoin Itching    Olmesartan Itching    Tramadol Itching    Venlafaxine & Related Hives and Itching     Past Medical History:   Diagnosis Date

## 2025-02-17 ENCOUNTER — OFFICE VISIT (OUTPATIENT)
Age: 65
End: 2025-02-17
Payer: MEDICARE

## 2025-02-17 VITALS
HEIGHT: 65 IN | HEART RATE: 56 BPM | WEIGHT: 180.4 LBS | SYSTOLIC BLOOD PRESSURE: 130 MMHG | DIASTOLIC BLOOD PRESSURE: 82 MMHG | BODY MASS INDEX: 30.06 KG/M2

## 2025-02-17 DIAGNOSIS — I25.10 ATHEROSCLEROSIS OF NATIVE CORONARY ARTERY OF NATIVE HEART WITHOUT ANGINA PECTORIS: Primary | ICD-10-CM

## 2025-02-17 PROCEDURE — 3079F DIAST BP 80-89 MM HG: CPT | Performed by: INTERNAL MEDICINE

## 2025-02-17 PROCEDURE — 1090F PRES/ABSN URINE INCON ASSESS: CPT | Performed by: INTERNAL MEDICINE

## 2025-02-17 PROCEDURE — 99213 OFFICE O/P EST LOW 20 MIN: CPT | Performed by: INTERNAL MEDICINE

## 2025-02-17 PROCEDURE — 1036F TOBACCO NON-USER: CPT | Performed by: INTERNAL MEDICINE

## 2025-02-17 PROCEDURE — G8400 PT W/DXA NO RESULTS DOC: HCPCS | Performed by: INTERNAL MEDICINE

## 2025-02-17 PROCEDURE — 93000 ELECTROCARDIOGRAM COMPLETE: CPT | Performed by: INTERNAL MEDICINE

## 2025-02-17 PROCEDURE — 3017F COLORECTAL CA SCREEN DOC REV: CPT | Performed by: INTERNAL MEDICINE

## 2025-02-17 PROCEDURE — 1126F AMNT PAIN NOTED NONE PRSNT: CPT | Performed by: INTERNAL MEDICINE

## 2025-02-17 PROCEDURE — 3075F SYST BP GE 130 - 139MM HG: CPT | Performed by: INTERNAL MEDICINE

## 2025-02-17 PROCEDURE — G8417 CALC BMI ABV UP PARAM F/U: HCPCS | Performed by: INTERNAL MEDICINE

## 2025-02-17 PROCEDURE — 1123F ACP DISCUSS/DSCN MKR DOCD: CPT | Performed by: INTERNAL MEDICINE

## 2025-02-17 PROCEDURE — 1159F MED LIST DOCD IN RCRD: CPT | Performed by: INTERNAL MEDICINE

## 2025-02-17 PROCEDURE — 1160F RVW MEDS BY RX/DR IN RCRD: CPT | Performed by: INTERNAL MEDICINE

## 2025-02-17 PROCEDURE — G8427 DOCREV CUR MEDS BY ELIG CLIN: HCPCS | Performed by: INTERNAL MEDICINE

## 2025-02-17 RX ORDER — DULOXETIN HYDROCHLORIDE 30 MG/1
30 CAPSULE, DELAYED RELEASE ORAL DAILY
COMMUNITY
Start: 2024-12-11

## 2025-02-17 ASSESSMENT — ENCOUNTER SYMPTOMS
HEMATEMESIS: 0
SHORTNESS OF BREATH: 0
HEMATOCHEZIA: 0
ABDOMINAL PAIN: 0
WHEEZING: 0
CHEST TIGHTNESS: 0
SPUTUM PRODUCTION: 0
BLURRED VISION: 0
DIARRHEA: 0
HOARSE VOICE: 0
COLOR CHANGE: 0
BOWEL INCONTINENCE: 0
ORTHOPNEA: 0

## 2025-02-17 NOTE — PROGRESS NOTES
Negative for blurred vision.   Cardiovascular:  Negative for chest pain, claudication, cyanosis, dyspnea on exertion, irregular heartbeat, leg swelling, near-syncope, orthopnea, palpitations, paroxysmal nocturnal dyspnea and syncope.   Respiratory:  Negative for chest tightness, shortness of breath, sputum production and wheezing.    Endocrine: Negative for polydipsia, polyphagia and polyuria.   Skin:  Negative for color change.   Musculoskeletal:  Negative for muscle weakness.   Gastrointestinal:  Negative for abdominal pain, bowel incontinence, diarrhea, hematemesis and hematochezia.   Genitourinary:  Negative for dysuria, frequency and hematuria.   Neurological:  Positive for dizziness (rare). Negative for focal weakness, light-headedness, loss of balance, numbness, sensory change and weakness.   Psychiatric/Behavioral:  Negative for altered mental status and memory loss.            PHYSICAL EXAM:   /82   Pulse 56   Ht 1.651 m (5' 5\")   Wt 81.8 kg (180 lb 6.4 oz)   BMI 30.02 kg/m²      Physical Exam  Constitutional:       Appearance: Normal appearance.   HENT:      Head: Normocephalic and atraumatic.      Nose: Nose normal.   Eyes:      Extraocular Movements: Extraocular movements intact.      Pupils: Pupils are equal, round, and reactive to light.   Neck:      Vascular: No carotid bruit.   Cardiovascular:      Rate and Rhythm: Regular rhythm.      Pulses: Normal pulses.      Heart sounds: No murmur heard.  Pulmonary:      Effort: Pulmonary effort is normal.      Breath sounds: Normal breath sounds.   Abdominal:      General: Abdomen is flat. Bowel sounds are normal.      Palpations: Abdomen is soft.   Musculoskeletal:         General: Normal range of motion.      Cervical back: Normal range of motion and neck supple.   Skin:     General: Skin is warm and dry.   Neurological:      General: No focal deficit present.      Mental Status: She is alert and oriented to person, place, and time.   Psychiatric:

## (undated) DEVICE — 90-S ACCELERATOR, SUCTION PROBE, NON-BENDABLE, MAX CUT LEVEL 11: Brand: SERFAS ENERGY

## (undated) DEVICE — T4 HOOD

## (undated) DEVICE — SYRINGE CATH TIP 50ML

## (undated) DEVICE — RIMMED SPEED PIN 45MM STERILE

## (undated) DEVICE — SYR 10ML LUER LOK 1/5ML GRAD --

## (undated) DEVICE — GARMENT,MEDLINE,DVT,INT,CALF,MED, GEN2: Brand: MEDLINE

## (undated) DEVICE — 3M™ COBAN™ SELF-ADHERENT WRAP, 1586S, STERILE, 6 IN X 5 YD (15 CM X 4,5 M), 12 ROLLS/CASE: Brand: 3M™ COBAN™

## (undated) DEVICE — (D)STRIP SKN CLSR 0.5X4IN WHT --

## (undated) DEVICE — SKIN STAPLER: Brand: SIGNET

## (undated) DEVICE — INFLOW CASSETTE TUBING, DO NOT USE IF PACKAGE IS DAMAGED: Brand: CROSSFLOW

## (undated) DEVICE — STERILE PRESSURE PROTECTOR PAD® FOR DE MAYO UNIVERSAL DISTRACTOR® (10/CASE): Brand: DE MAYO UNIVERSAL DISTRACTOR®

## (undated) DEVICE — SUTURE MCRYL SZ 3-0 L27IN ABSRB UD L19MM PS-2 3/8 CIR PRIM Y427H

## (undated) DEVICE — SOLUTION IV 1000ML 0.9% SOD CHL

## (undated) DEVICE — ZIMMER® STERILE DISPOSABLE TOURNIQUET CUFF WITH PLC, DUAL PORT, SINGLE BLADDER, 42 IN. (107 CM)

## (undated) DEVICE — SUTURE MCRYL SZ 2-0 L27IN ABSRB UD CP-1 1 L36MM 1/2 CIR REV Y266H

## (undated) DEVICE — SUTURE PDS II SZ 1 L96IN ABSRB VLT TP-1 L65MM 1/2 CIR Z880G

## (undated) DEVICE — Device: Brand: STABLECUT®

## (undated) DEVICE — SYR 50ML LR LCK 1ML GRAD NSAF --

## (undated) DEVICE — SUTURE ETHBND EXCEL SZ 2 L30IN NONABSORBABLE GRN L40MM V-37 MX69G

## (undated) DEVICE — PACK PROCEDURE SURG TOT KNEE

## (undated) DEVICE — (D)PREP SKN CHLRAPRP APPL 26ML -- CONVERT TO ITEM 371833

## (undated) DEVICE — BLADE SHV DIA4MM RED RESECT FOR GEN DEB REM OF PERIOST FR

## (undated) DEVICE — CARDINAL HEALTH FLEXIBLE LIGHT HANDLE COVER: Brand: CARDINAL HEALTH

## (undated) DEVICE — SUTURE STRATAFIX SPRL MCRYL + SZ 3-0 L18IN ABSRB UD PS-2 SXMP1B107

## (undated) DEVICE — SUTURE MCRYL SZ 3-0 L27IN ABSRB UD L24MM PS-1 3/8 CIR PRIM Y936H

## (undated) DEVICE — T-MAX DISPOSABLE FACE MASK 8 PER BOX

## (undated) DEVICE — REM POLYHESIVE ADULT PATIENT RETURN ELECTRODE: Brand: VALLEYLAB

## (undated) DEVICE — NEEDLE HYPO 18GA L1.5IN PNK S STL HUB POLYPR SHLD REG BVL

## (undated) DEVICE — STRIP,CLOSURE,WOUND,MEDI-STRIP,1/2X4: Brand: MEDLINE

## (undated) DEVICE — FAN SPRAY KIT: Brand: PULSAVAC®

## (undated) DEVICE — IMMOBILIZER ORTH LIGHTWEIGHT LG UNIV 9X7 IN PREMIERPRO

## (undated) DEVICE — TRAY PREP DRY W/ PREM GLV 2 APPL 6 SPNG 2 UNDPD 1 OVERWRAP

## (undated) DEVICE — Device

## (undated) DEVICE — BUTTON SWITCH PENCIL BLADE ELECTRODE, HOLSTER: Brand: EDGE

## (undated) DEVICE — NDL SPNE QNCKE 18GX3.5IN LF --

## (undated) DEVICE — Device: Brand: POWER-FLO®

## (undated) DEVICE — DRAPE,U/SHT,SPLIT,FILM,60X84,STERILE: Brand: MEDLINE

## (undated) DEVICE — BANDAGE COMPR SELF ADH 5 YDX4 IN TAN STRL PREMIERPRO LF

## (undated) DEVICE — KIT CHAIR TRIMANO FOAM W/ SUPP ARM DRP ERGONOMICALLY DESIGNED

## (undated) DEVICE — MEDI-VAC YANKAUER SUCTION HANDLE W/BULBOUS TIP: Brand: CARDINAL HEALTH

## (undated) DEVICE — SOLUTION IRRIG 3000ML 0.9% SOD CHL FLX CONT 0797208] ICU MEDICAL INC]

## (undated) DEVICE — TRAY CATH 16F DRN BG LTX -- CONVERT TO ITEM 363158

## (undated) DEVICE — OUTFLOW CASSETTE TUBING, DO NOT USE IF PACKAGE IS DAMAGED: Brand: CROSSFLOW

## (undated) DEVICE — [AGGRESSIVE 6-FLUTE BARREL BUR, ARTHROSCOPIC SHAVER BLADE,  DO NOT RESTERILIZE,  DO NOT USE IF PACKAGE IS DAMAGED,  KEEP DRY,  KEEP AWAY FROM SUNLIGHT]: Brand: FORMULA

## (undated) DEVICE — 3M™ IOBAN™ 2 ANTIMICROBIAL INCISE DRAPE 6650EZ: Brand: IOBAN™ 2

## (undated) DEVICE — SUTURE ETHBND D SPEC NO 5 MO 6 30IN 3 PK D9533

## (undated) DEVICE — 4-PORT MANIFOLD: Brand: NEPTUNE 2

## (undated) DEVICE — DRAPE,SHOULDER,BEACH CHAIR,STERILE: Brand: MEDLINE

## (undated) DEVICE — SHLDR ARTHRO PAYLOR,ROBERSON: Brand: MEDLINE INDUSTRIES, INC.

## (undated) DEVICE — 3000CC GUARDIAN II: Brand: GUARDIAN

## (undated) DEVICE — 2000CC GUARDIAN II: Brand: GUARDIAN

## (undated) DEVICE — DRAPE,TOP,102X53,STERILE: Brand: MEDLINE

## (undated) DEVICE — SOFT SILICONE HYDROCELLULAR FOAM DRESSING WITH LOCK AWAY LAYER: Brand: ALLEVYN LIFE XL 21X21 CTN10

## (undated) DEVICE — WATERPROOF, BACTERIA PROOF DRESSING WITH ABSORBENT SEE THROUGH PAD: Brand: OPSITE POST-OP VISIBLE 15X10CM CTN 20

## (undated) DEVICE — BIPOLAR SEALER 23-112-1 AQM 6.0: Brand: AQUAMANTYS ®

## (undated) DEVICE — CURETTE BNE CEM 10IN DISP --

## (undated) DEVICE — NON RIMMED SPEED PIN 65MM STERILE

## (undated) DEVICE — SUT ETHLN 3-0 18IN PS2 BLK --

## (undated) DEVICE — Z DISCONTINUED USE 2744636  DRESSING AQUACEL 14 IN ALG W3.5XL14IN POLYUR FLM CVR W/ HYDRCOLL

## (undated) DEVICE — STOCKINETTE,IMPERVIOUS,12X48,STERILE: Brand: MEDLINE

## (undated) DEVICE — SYR LR LCK 1ML GRAD NSAF 30ML --

## (undated) DEVICE — SOLUTION IV DEXTROSE/SALINE 5%-0.9% 500ML - 500ML

## (undated) DEVICE — MASTISOL ADHESIVE LIQ 2/3ML

## (undated) DEVICE — SOLUTION IRRIG 1000ML H2O STRL BLT